# Patient Record
Sex: FEMALE | Employment: UNEMPLOYED | URBAN - METROPOLITAN AREA
[De-identification: names, ages, dates, MRNs, and addresses within clinical notes are randomized per-mention and may not be internally consistent; named-entity substitution may affect disease eponyms.]

---

## 2017-01-24 ENCOUNTER — TELEPHONE (OUTPATIENT)
Dept: OBGYN CLINIC | Age: 37
End: 2017-01-24

## 2017-01-24 NOTE — TELEPHONE ENCOUNTER
40year old patient requesting to have her IUD taken out. Patient states her sex life has decreased, her face has broken out and she is still having continued vaginal bleeding. Patient added to Dr. Kim Belle schedule to have IUD removed.

## 2017-02-06 ENCOUNTER — OFFICE VISIT (OUTPATIENT)
Dept: OBGYN CLINIC | Age: 37
End: 2017-02-06

## 2017-02-06 VITALS
HEIGHT: 62 IN | WEIGHT: 124.4 LBS | RESPIRATION RATE: 16 BRPM | DIASTOLIC BLOOD PRESSURE: 68 MMHG | BODY MASS INDEX: 22.89 KG/M2 | SYSTOLIC BLOOD PRESSURE: 116 MMHG

## 2017-02-06 DIAGNOSIS — Z30.431 IUD CHECK UP: Primary | ICD-10-CM

## 2017-02-06 NOTE — PROGRESS NOTES
IUD follow up      This is a follow-up visit for Elder Mederos is a 41 yo  who had an Mirena IUD placed 3 months ago on 16. Pt reports that since the IUD placement, the patient has had almost daily vaginal spotting and mild cramping. She denies any HMB or severe pain. She describes having a spotting amount of blood-tinged discharge which has occurred off and on since insertion of the Mirena. It is ruining her sex life with her . She also feels like she has had mood changes and weight gain associated with hormones from IUD. Pt would like to discuss IUD removal today. She does NOT wish to conceive and is NOT interested in alternative contraceptive options. Associated signs and symptoms: she denies dyspareunia, expulsion, heavy bleeding, increased pain, fever, and pelvic pain. No past medical history on file. Depression. Past Surgical History   Procedure Laterality Date    Hx  section       x2    Hx bartholin cyst marsupialization       Social History     Occupational History    Not on file. Social History Main Topics    Smoking status: Never Smoker    Smokeless tobacco: Never Used    Alcohol use 1.2 oz/week     2 Glasses of wine per week    Drug use: No    Sexual activity: Yes     Partners: Male     Birth control/ protection: None     Family History   Problem Relation Age of Onset    No Known Problems Mother        No Known Allergies     Prior to Admission medications    Medication Sig Start Date End Date Taking? Authorizing Provider   buPROPion SR [facility] SR) 150 mg SR tablet Take  by mouth two (2) times a day.    Yes Historical Provider        Review of Systems: History obtained from the patient  Constitutional: negative for weight loss, fever, night sweats, +moodiness, weight gain  Breast: negative for breast lumps, nipple discharge, galactorrhea  GI: negative for change in bowel habits, abdominal pain, black or bloody stools  : negative for frequency, dysuria, hematuria, vaginal discharge, +daily spotting, uterine cramping  MSK: negative for back pain, joint pain, muscle pain  Skin: negative for itching, rash, hives  Psych: negative for anxiety, depression, change in mood      Objective:  Visit Vitals    /68 (BP 1 Location: Right arm, BP Patient Position: Sitting)    Resp 16    Ht 5' 2\" (1.575 m)    Wt 124 lb 6.4 oz (56.4 kg)    LMP  (LMP Unknown)    BMI 22.75 kg/m2       PHYSICAL EXAMINATION    Constitutional  · Appearance: well-nourished, well developed, alert, in no acute distress    Gastrointestinal  · Abdominal Examination: abdomen non-tender to palpation, normal bowel sounds, no masses present  · Liver and spleen: no hepatomegaly present, spleen not palpable  · Hernias: no hernias identified    Genitourinary  · External Genitalia: normal appearance for age, no discharge present, no tenderness present, no inflammatory lesions present, no masses present, no atrophy present  · Vagina: normal vaginal vault without central or paravaginal defects, no discharge present, no inflammatory lesions present, no masses present  · Bladder: non-tender to palpation  · Urethra: appears normal  · Cervix: normal with IUD string visible and appropriate length, no cervicitis, no CMT  · Uterus: normal size, shape and consistency  · Adnexa: no adnexal tenderness present, no adnexal masses present  · Perineum: perineum within normal limits, no evidence of trauma, no rashes or skin lesions present    Skin  · General Inspection: no rash, no lesions identified    Neurologic/Psychiatric  · Mental Status:  · Orientation: grossly oriented to person, place and time  · Mood and Affect: mood normal, affect appropriate    Assessment:   41 yo presenting for IUD check and discussion of IUD removal due to almost daily cramping and spotting x 2 months since insertion in Nov 2016.     Plan:   -discussed that some light daily bleeding and cramping can be normal in first couple of months after IUD insertion  -discussed ultrasound to check IUD position, though on exam IUD strings visible, and pt without any significant pain, benign exam  -pt to RTC in 1 month, if still having daily spotting at that time could consider ultrasound   -pain and bleeding precautions reviewed    Eder Styles MD  2/6/2017  10:12 AM

## 2017-02-06 NOTE — PROGRESS NOTES
[facility]  OFFICE PROCEDURE PROGRESS NOTE        Chart reviewed for the following:   Wali ADAM have reviewed the History, Physical and updated the Allergic reactions for [address] performed immediately prior to start of procedure:   Wali ADAM have performed the following reviews on Rhina Shelley prior to the start of the procedure:            * Patient was identified by name and date of birth   * Agreement on procedure being performed was verified  * Risks and Benefits explained to the patient  * Procedure site verified and marked as necessary  * Patient was positioned for comfort  * Consent was signed and verified     Time: 9:45am      Date of procedure: 2017    Procedure performed by:  Laurence Marie MD    Provider assisted by: Wali Salinas RN    Patient assisted by: self    How tolerated by patient: tolerated the procedure well with no complications    Post Procedural Pain Scale: 0 - No Hurt    Comments: none            -----------------------------------IUD REMOVAL---------------------  Indications for Removal:  Rhina Shelley is a ,  40 y.o. female [facility] whose No LMP recorded (lmp unknown). Patient is not currently having periods (Reason: IUD). was on . who presents today for IUD removal. Her current IUD was placedtwo months. She has not had *** problems with the IUD. She requests removal of the IUD because she doesn't like it. The IUD removal procedure was discussed with the patient and she had no further questions. Procedure: The patient was placed in a dorsal lithotomy position and appropriately draped. On bimanual exam the uterus was anterior and normal in size with no tenderness present. A speculum exam was performed and the cervix was visualized. The cervix was prepped with zephiran solution. The IUD string was visualized. Using ring forceps , the string was grasped and the IUD removed intact.  The IUD was shown to the patient.

## 2018-01-12 ENCOUNTER — OFFICE VISIT (OUTPATIENT)
Dept: OBGYN CLINIC | Age: 38
End: 2018-01-12

## 2018-01-12 VITALS
BODY MASS INDEX: 21.16 KG/M2 | WEIGHT: 115 LBS | SYSTOLIC BLOOD PRESSURE: 110 MMHG | HEIGHT: 62 IN | DIASTOLIC BLOOD PRESSURE: 68 MMHG | RESPIRATION RATE: 16 BRPM

## 2018-01-12 DIAGNOSIS — N93.9 ABNORMAL UTERINE BLEEDING (AUB): ICD-10-CM

## 2018-01-12 DIAGNOSIS — Z30.9 ENCOUNTER FOR CONTRACEPTIVE MANAGEMENT, UNSPECIFIED TYPE: ICD-10-CM

## 2018-01-12 DIAGNOSIS — N89.8 VAGINAL DISCHARGE: Primary | ICD-10-CM

## 2018-01-12 LAB
HCG URINE, QL. (POC): NEGATIVE
VALID INTERNAL CONTROL?: YES

## 2018-01-12 RX ORDER — MEDROXYPROGESTERONE ACETATE 150 MG/ML
150 INJECTION, SUSPENSION INTRAMUSCULAR ONCE
Qty: 1 SYRINGE | Refills: 4 | Status: SHIPPED | OUTPATIENT
Start: 2018-01-12 | End: 2018-01-12

## 2018-01-12 NOTE — PROGRESS NOTES
Date last pap: n/a. Last Depo-Provera: new start. Side Effects if any: none. Serum HCG indicated? UPT today was negative. Depo-Provera 150 mg IM given by: Wally Joyce RN. Next appointment due 03/28/18-04/11/18. Patient received depo injection in right upper gluteus without complications per MD order. Patient given future dates and advised to make next appt at . Patient verbalized understanding.

## 2018-01-12 NOTE — PATIENT INSTRUCTIONS
Pelvic Exam: Care Instructions  Your Care Instructions    When your doctor examines all of your pelvic organs, it's called a pelvic exam. Two good reasons to have this kind of exam are to check for sexually transmitted infections (STIs) and to get a Pap test. A Pap test is also called a Pap smear. It checks for early changes that can lead to cancer of the cervix. Sometimes a pelvic exam is part of a regular checkup. In this case, you can do some things to make your test results as accurate as possible. · Try to schedule the exam when you don't have your period. · Don't use douches, tampons, or vaginal medicines, sprays, or powders for 24 hours before your exam.  · Don't have sex for 24 hours before your exam.  Other times, women have this kind of exam at any time of the month. This is because they have pelvic pain, bleeding, or discharge. Or they may have another pelvic problem. Before your exam, it's important to share some information with your doctor. For example, if you are a survivor of rape or sexual abuse, you can talk about any concerns you may have. Your doctor will also want to know if you are pregnant or use birth control. And he or she will want to hear about any problems, surgeries, or procedures you have had in your pelvic area. You will also need to tell your doctor when your last period was. Follow-up care is a key part of your treatment and safety. Be sure to make and go to all appointments, and call your doctor if you are having problems. It's also a good idea to know your test results and keep a list of the medicines you take. How is a pelvic exam done? · During a pelvic exam, you will:  ¨ Take off your clothes below the waist. You will get a paper or cloth cover to put over the lower half of your body. Patsi Feil on your back on an exam table. Your feet will be raised above you. Stirrups will support your feet. · The doctor will:  [place] you to relax your knees.  Your knees need to lean out, toward the walls. ¨ Check the opening of your vagina for sores or swelling. ¨ Gently put a tool called a speculum into your vagina. It opens the vagina a little bit. You will feel some pressure. But if you are relaxed, it will not hurt. It lets your doctor see inside the vagina. ¨ Use a small brush, spatula, or swab to get a sample of cells, if you are having a Pap test or culture. The doctor then removes the speculum. ¨ Put on gloves and put one or two fingers of one hand into your vagina. The other hand goes on your lower belly. This lets your doctor feel your pelvic organs. You will probably feel some pressure. Try to stay relaxed. ¨ Put one gloved finger into your rectum and one into your vagina, if needed. This can also help check your pelvic organs. This exam takes about 10 minutes. At the end, you will get a washcloth or tissue to clean your vaginal area. It's normal to have some discharge after this exam. You can then get dressed. Some test results may be ready right away. But results from a culture or a Pap test may take several days or a few weeks. Why should you have a pelvic exam?  · You want to have recommended screening tests. This includes a Pap test.  · You think you have a vaginal infection. Signs include itching, burning, or unusual discharge. · You might have been exposed to a sexually transmitted infection (STI), such as chlamydia or herpes. · You have vaginal bleeding that is not part of your normal menstrual period. · You have pain in your belly or pelvis. · You have been sexually assaulted. A pelvic exam lets your doctor collect evidence and check for STIs. · You are pregnant. · You are having trouble getting pregnant. What are the risks of a pelvic exam?  There are no risks from a pelvic exam.  When should you call for help? Watch closely for changes in your health, and be sure to contact your doctor if you have any problems. Where can you learn more?   Go to http://ellie-toni.info/. Enter G667 in the search box to learn more about \"Pelvic Exam: Care Instructions. \"  Current as of: October 13, 2016  Content Version: 11.4  © 2775-7454 [employer]. Care instructions adapted under license by [employer] (which disclaims liability or warranty for this information). If you have questions about a medical condition or this instruction, always ask your healthcare professional. [place] [address] any warranty or liability for your use of this information.

## 2018-01-12 NOTE — MR AVS SNAPSHOT
Visit Information Date & Time Provider Department Dept. Phone Encounter #  
 1/12/2018 10:20 AM Musa Dillon MD [facility] [phone] [id number] Upcoming Health Maintenance Date Due Influenza Age 5 to Adult 8/1/2017 PAP AKA CERVICAL CYTOLOGY 10/12/2019 Allergies as of 1/12/2018  Review Complete On: 1/12/2018 By: Monica Ram RN No Known Allergies Current Immunizations  Never Reviewed No immunizations on file. Not reviewed this visit You Were Diagnosed With   
  
 Codes Comments Vaginal discharge    -  Primary ICD-10-CM: N89.8 ICD-9-CM: 623.5 Vaginal odor     ICD-10-CM: N89.8 ICD-9-CM: 625.8 Vitals BP Resp Height(growth percentile) Weight(growth percentile) BMI OB Status 110/68 (BP 1 Location: Right arm, BP Patient Position: Sitting) 16 5' 2\" (1.575 m) 115 lb (52.2 kg) 21.03 kg/m2 IUD Smoking Status Never Smoker BMI and BSA Data Body Mass Index Body Surface Area 21.03 kg/m 2 1.51 m 2 Preferred Pharmacy Pharmacy Name Phone [id number] [address] [place] [address]) [phone] Your Updated Medication List  
  
   
This list is accurate as of: 1/12/18 10:38 AM.  Always use your most recent med list.  
  
  
  
  
 buPROPion  mg SR tablet Commonly known as:  Tomma Peers Take  by mouth two (2) times a day. Patient Instructions Pelvic Exam: Care Instructions Your Care Instructions When your doctor examines all of your pelvic organs, it's called a pelvic exam. Two good reasons to have this kind of exam are to check for sexually transmitted infections (STIs) and to get a Pap test. A Pap test is also called a Pap smear. It checks for early changes that can lead to cancer of the cervix. Sometimes a pelvic exam is part of a regular checkup.  In this case, you can do some things to make your test results as accurate as possible. · Try to schedule the exam when you don't have your period. · Don't use douches, tampons, or vaginal medicines, sprays, or powders for 24 hours before your exam. 
· Don't have sex for 24 hours before your exam. 
Other times, women have this kind of exam at any time of the month. This is because they have pelvic pain, bleeding, or discharge. Or they may have another pelvic problem. Before your exam, it's important to share some information with your doctor. For example, if you are a survivor of rape or sexual abuse, you can talk about any concerns you may have. Your doctor will also want to know if you are pregnant or use birth control. And he or she will want to hear about any problems, surgeries, or procedures you have had in your pelvic area. You will also need to tell your doctor when your last period was. Follow-up care is a key part of your treatment and safety. Be sure to make and go to all appointments, and call your doctor if you are having problems. It's also a good idea to know your test results and keep a list of the medicines you take. How is a pelvic exam done? · During a pelvic exam, you will: ¨ Take off your clothes below the waist. You will get a paper or cloth cover to put over the lower half of your body. [place] on your back on an exam table. Your feet will be raised above you. Stirrups will support your feet. · The doctor will: ¨ Ask you to relax your knees. Your knees need to lean out, toward the walls. ¨ Check the opening of your vagina for sores or swelling. ¨ Gently put a tool called a speculum into your vagina. It opens the vagina a little bit. You will feel some pressure. But if you are relaxed, it will not hurt. It lets your doctor see inside the vagina. ¨ Use a small brush, spatula, or swab to get a sample of cells, if you are having a Pap test or culture. The doctor then removes the speculum. ¨ Put on gloves and put one or two fingers of one hand into your vagina. The other hand goes on your lower belly. This lets your doctor feel your pelvic organs. You will probably feel some pressure. Try to stay relaxed. ¨ Put one gloved finger into your rectum and one into your vagina, if needed. This can also help check your pelvic organs. This exam takes about 10 minutes. At the end, you will get a washcloth or tissue to clean your vaginal area. It's normal to have some discharge after this exam. You can then get dressed. Some test results may be ready right away. But results from a culture or a Pap test may take several days or a few weeks. Why should you have a pelvic exam? 
· You want to have recommended screening tests. This includes a Pap test. 
· You think you have a vaginal infection. Signs include itching, burning, or unusual discharge. · You might have been exposed to a sexually transmitted infection (STI), such as chlamydia or herpes. · You have vaginal bleeding that is not part of your normal menstrual period. · You have pain in your belly or pelvis. · You have been sexually assaulted. A pelvic exam lets your doctor collect evidence and check for STIs. · You are pregnant. · You are having trouble getting pregnant. What are the risks of a pelvic exam? 
There are no risks from a pelvic exam. 
When should you call for help? Watch closely for changes in your health, and be sure to contact your doctor if you have any problems. Where can you learn more? Go to http://ellie-toni.info/. Enter X450 in the search box to learn more about \"Pelvic Exam: Care Instructions. \" Current as of: October 13, 2016 Content Version: 11.4 © 2500-4948 [employer]. Care instructions adapted under license by [employer] (which disclaims liability or warranty for this information).  If you have questions about a medical condition or this instruction, always ask your healthcare professional. [address] any warranty or liability for your use of this information. Introducing hospitals & HEALTH SERVICES! Dear Issac Cannon: Thank you for requesting a ValetAnywhere account. Our records indicate that you already have an active ValetAnywhere account. You can access your account anytime at [email or web address]://[employer]. InMyShow/[employer] Did you know that you can access your hospital and ER discharge instructions at any time in ValetAnywhere? You can also review all of your test results from your hospital stay or ER visit. Additional Information If you have questions, please visit the Frequently Asked Questions section of the ValetAnywhere website at [email or web address]. InMyShow/[employer]/. Remember, ValetAnywhere is NOT to be used for urgent needs. For medical emergencies, dial 911. Now available from your iPhone and Android! Please provide this summary of care documentation to your next provider. If you have any questions after today's visit, please call [phone].

## 2018-01-12 NOTE — PROGRESS NOTES
Abnormal Uterine bleeding evaluation    Chief Complaint   Vaginal Discharge      HPI  40 y.o. female complains of bloody vaginal discharge and irregular bleeding since insertion of her IUD. Patient feels as if she has a mini cycle every week since having the Mirena placed. She originally had the IUD placed to help with heavy bleeding but continues to have issues. She denies additional symptoms at this time. The patient denies aggravating factors. She is not concerned about possible STI exposure at this time. She denies exposure to new chemicals ot hygenic agents  Previous treatment included: none    History reviewed. No pertinent past medical history. Past Surgical History:   Procedure Laterality Date    HX BARTHOLIN CYST MARSUPIALIZATION      HX  SECTION      x2     Social History     Occupational History    Not on file. Social History Main Topics    Smoking status: Never Smoker    Smokeless tobacco: Never Used    Alcohol use 1.2 oz/week     2 Glasses of wine per week    Drug use: No    Sexual activity: Yes     Partners: Male     Birth control/ protection: None     Family History   Problem Relation Age of Onset    No Known Problems Mother         No Known Allergies  Prior to Admission medications    Medication Sig Start Date End Date Taking? Authorizing Provider   buPROPion SR [facility] SR) 150 mg SR tablet Take  by mouth two (2) times a day.    Yes Historical Provider                      Review of Systems - History obtained from the patient  Constitutional: negative for weight loss, fever, night sweats  Breast: negative for breast lumps, nipple discharge, galactorrhea  GI: negative for change in bowel habits, abdominal pain, black or bloody stools  : negative for frequency, dysuria, hematuria  MSK: negative for back pain, joint pain, muscle pain  Skin: negative for itching, rash, hives  Neuro: negative for dizziness, headache, confusion, weakness  Psych: negative for anxiety, depression, change in mood  Heme/lymph: negative for bleeding, bruising, pallor       Objective:    Visit Vitals    /68 (BP 1 Location: Right arm, BP Patient Position: Sitting)    Resp 16    Ht 5' 2\" (1.575 m)    Wt 115 lb (52.2 kg)    BMI 21.03 kg/m2       Physical Exam:   PHYSICAL EXAMINATION    Constitutional  · Appearance: well-nourished, well developed, alert, in no acute distress    HENT  · Head and Face: appears normal    Genitourinary  · External Genitalia: normal appearance for age, No discharge present, no tenderness present, no inflammatory lesions present, no masses present, no atrophy present  · Vagina:  + discharge present, otherwise normal vaginal vault without central or paravaginal defects, no inflammatory lesions present, no masses present  · Bladder: non-tender to palpation  · Urethra: appears normal  · Cervix: normal   · Uterus: normal size, shape and consistency  · Adnexa: no adnexal tenderness present, no adnexal masses present  · Perineum: perineum within normal limits, no evidence of trauma, no rashes or skin lesions present  · Anus: anus within normal limits, no hemorrhoids present  · Inguinal Lymph Nodes: no lymphadenopathy present    Skin  · General Inspection: no rash, no lesions identified    Neurologic/Psychiatric  · Mental Status:  · Orientation: grossly oriented to person, place and time  · Mood and Affect: mood normal, affect appropriate      Assessment/Plan:   Persistent aub with iud. Removed today will start Depo Provera instead. Discussed other hormonal options as well as ablation if she continues to have heavy cycles. If aub does not improve with the depo then she will rto for ultrasound and possible end bx. ROV prn if symptoms persist or worsen.     [facility]  OFFICE PROCEDURE PROGRESS NOTE        Chart reviewed for the following:   Steve Menjivar MD, have reviewed the History, Physical and updated the Allergic reactions for Sherren Cushing [facility] performed immediately prior to start of procedure:   I, Aparna Contreras MD, have performed the following reviews on Adriana Ruelas prior to the start of the procedure:            * Patient was identified by name and date of birth   * Agreement on procedure being performed was verified  * Risks and Benefits explained to the patient  * Procedure site verified and marked as necessary  * Patient was positioned for comfort  * Consent was signed and verified     Date of procedure: 1/15/2018    Procedure performed by:  Aparna Contreras MD    Patient assisted by: self    How tolerated by patient: tolerated the procedure well with no complications    Post Procedural Pain Scale: 0 - No Hurt    Comments: none          IUD REMOVAL  Indications for Removal:  Adriana Ruelas is a ,  40 y.o. female [facility] [place] who has had continued bleeding since insertion of the IUD. She presents today for IUD removal.   The IUD removal procedure was discussed with the patient and she had no further questions. Procedure: The patient was placed in a dorsal lithotomy position and appropriately draped. On bimanual exam the uterus was anterior and normal in size with no tenderness present. A speculum exam was performed and the cervix was visualized. The cervix was prepped with zephiran solution. The IUD string was visualized. Using ring forceps , the string was grasped and the IUD removed intact. The IUD was shown to the patient.

## 2018-03-28 ENCOUNTER — OFFICE VISIT (OUTPATIENT)
Dept: OBGYN CLINIC | Age: 38
End: 2018-03-28

## 2018-03-28 VITALS
BODY MASS INDEX: 21.35 KG/M2 | HEIGHT: 62 IN | SYSTOLIC BLOOD PRESSURE: 106 MMHG | DIASTOLIC BLOOD PRESSURE: 64 MMHG | WEIGHT: 116 LBS

## 2018-03-28 DIAGNOSIS — Z01.419 ENCOUNTER FOR GYNECOLOGICAL EXAMINATION WITHOUT ABNORMAL FINDING: Primary | ICD-10-CM

## 2018-03-28 DIAGNOSIS — R39.15 URGENCY OF URINATION: ICD-10-CM

## 2018-03-28 RX ORDER — NYSTATIN AND TRIAMCINOLONE ACETONIDE 100000; 1 [USP'U]/G; MG/G
CREAM TOPICAL
Qty: 30 G | Refills: 0 | Status: SHIPPED | OUTPATIENT
Start: 2018-03-28 | End: 2019-02-11

## 2018-03-28 RX ORDER — MEDROXYPROGESTERONE ACETATE 150 MG/ML
150 INJECTION, SUSPENSION INTRAMUSCULAR ONCE
COMMUNITY
End: 2018-03-28 | Stop reason: SINTOL

## 2018-03-28 RX ORDER — ETONOGESTREL AND ETHINYL ESTRADIOL 11.7; 2.7 MG/1; MG/1
INSERT, EXTENDED RELEASE VAGINAL
Qty: 3 DEVICE | Refills: 4 | Status: SHIPPED | OUTPATIENT
Start: 2018-03-28 | End: 2019-02-11

## 2018-03-28 NOTE — PATIENT INSTRUCTIONS
Pelvic Exam: Care Instructions  Your Care Instructions    When your doctor examines all of your pelvic organs, it's called a pelvic exam. Two good reasons to have this kind of exam are to check for sexually transmitted infections (STIs) and to get a Pap test. A Pap test is also called a Pap smear. It checks for early changes that can lead to cancer of the cervix. Sometimes a pelvic exam is part of a regular checkup. In this case, you can do some things to make your test results as accurate as possible. · Try to schedule the exam when you don't have your period. · Don't use douches, tampons, or vaginal medicines, sprays, or powders for 24 hours before your exam.  · Don't have sex for 24 hours before your exam.  Other times, women have this kind of exam at any time of the month. This is because they have pelvic pain, bleeding, or discharge. Or they may have another pelvic problem. Before your exam, it's important to share some information with your doctor. For example, if you are a survivor of rape or sexual abuse, you can talk about any concerns you may have. Your doctor will also want to know if you are pregnant or use birth control. And he or she will want to hear about any problems, surgeries, or procedures you have had in your pelvic area. You will also need to tell your doctor when your last period was. Follow-up care is a key part of your treatment and safety. Be sure to make and go to all appointments, and call your doctor if you are having problems. It's also a good idea to know your test results and keep a list of the medicines you take. How is a pelvic exam done? · During a pelvic exam, you will:  ¨ Take off your clothes below the waist. You will get a paper or cloth cover to put over the lower half of your body. Merrianne Schaumann on your back on an exam table. Your feet will be raised above you. Stirrups will support your feet. · The doctor will:  Mary Bush you to relax your knees.  Your knees need to lean out, toward the walls. ¨ Check the opening of your vagina for sores or swelling. ¨ Gently put a tool called a speculum into your vagina. It opens the vagina a little bit. You will feel some pressure. But if you are relaxed, it will not hurt. It lets your doctor see inside the vagina. ¨ Use a small brush, spatula, or swab to get a sample of cells, if you are having a Pap test or culture. The doctor then removes the speculum. ¨ Put on gloves and put one or two fingers of one hand into your vagina. The other hand goes on your lower belly. This lets your doctor feel your pelvic organs. You will probably feel some pressure. Try to stay relaxed. ¨ Put one gloved finger into your rectum and one into your vagina, if needed. This can also help check your pelvic organs. This exam takes about 10 minutes. At the end, you will get a washcloth or tissue to clean your vaginal area. It's normal to have some discharge after this exam. You can then get dressed. Some test results may be ready right away. But results from a culture or a Pap test may take several days or a few weeks. Why should you have a pelvic exam?  · You want to have recommended screening tests. This includes a Pap test.  · You think you have a vaginal infection. Signs include itching, burning, or unusual discharge. · You might have been exposed to a sexually transmitted infection (STI), such as chlamydia or herpes. · You have vaginal bleeding that is not part of your normal menstrual period. · You have pain in your belly or pelvis. · You have been sexually assaulted. A pelvic exam lets your doctor collect evidence and check for STIs. · You are pregnant. · You are having trouble getting pregnant. What are the risks of a pelvic exam?  There are no risks from a pelvic exam.  When should you call for help? Watch closely for changes in your health, and be sure to contact your doctor if you have any problems. Where can you learn more?   Go to http://ellie-toni.info/. Enter Y876 in the search box to learn more about \"Pelvic Exam: Care Instructions. \"  Current as of: October 13, 2016  Content Version: 11.4  © 1816-1666 [employer], [employer]. Care instructions adapted under license by [employer] (which disclaims liability or warranty for this information). If you have questions about a medical condition or this instruction, always ask your healthcare professional. [place] [address] any warranty or liability for your use of this information.

## 2018-03-28 NOTE — MR AVS SNAPSHOT
[address] [facility] [address] [facility] 
[phone] Patient: Clarence Avilez MRN: [id number] Visit Information Date & Time Provider Department Dept. Phone Encounter #  
 3/28/2018 10:20 AM John Byrd MD Harlan Pinedo [phone] [id number] Upcoming Health Maintenance Date Due Influenza Age 5 to Adult 8/1/2017 PAP AKA CERVICAL CYTOLOGY 10/12/2019 Allergies as of 3/28/2018  Review Complete On: 3/28/2018 By: César Castellano No Known Allergies Current Immunizations  Never Reviewed No immunizations on file. Not reviewed this visit Vitals BP Height(growth percentile) Weight(growth percentile) BMI OB Status Smoking Status 106/64 5' 2\" (1.575 m) 116 lb (52.6 kg) 21.22 kg/m2 Injection Never Smoker BMI and BSA Data Body Mass Index Body Surface Area  
 21.22 kg/m 2 1.52 m 2 Preferred Pharmacy Pharmacy Name Phone [id number] [place], [address] [place] [address]) [phone] Your Updated Medication List  
  
   
This list is accurate as of 3/28/18 10:23 AM.  Always use your most recent med list.  
  
  
  
  
 buPROPion  mg SR tablet Commonly known as:  Lashonda Scot Take  by mouth two (2) times a day. DEPO-PROVERA 150 mg/mL injection Generic drug:  medroxyPROGESTERone 150 mg by IntraMUSCular route once. Patient Instructions Pelvic Exam: Care Instructions Your Care Instructions When your doctor examines all of your pelvic organs, it's called a pelvic exam. Two good reasons to have this kind of exam are to check for sexually transmitted infections (STIs) and to get a Pap test. A Pap test is also called a Pap smear. It checks for early changes that can lead to cancer of the cervix. Sometimes a pelvic exam is part of a regular checkup. In this case, you can do some things to make your test results as accurate as possible. · Try to schedule the exam when you don't have your period. · Don't use douches, tampons, or vaginal medicines, sprays, or powders for 24 hours before your exam. 
· Don't have sex for 24 hours before your exam. 
Other times, women have this kind of exam at any time of the month. This is because they have pelvic pain, bleeding, or discharge. Or they may have another pelvic problem. Before your exam, it's important to share some information with your doctor. For example, if you are a survivor of rape or sexual abuse, you can talk about any concerns you may have. Your doctor will also want to know if you are pregnant or use birth control. And he or she will want to hear about any problems, surgeries, or procedures you have had in your pelvic area. You will also need to tell your doctor when your last period was. Follow-up care is a key part of your treatment and safety. Be sure to make and go to all appointments, and call your doctor if you are having problems. It's also a good idea to know your test results and keep a list of the medicines you take. How is a pelvic exam done? · During a pelvic exam, you will: ¨ Take off your clothes below the waist. You will get a paper or cloth cover to put over the lower half of your body. Peter Risk on your back on an exam table. Your feet will be raised above you. Stirrups will support your feet. · The doctor will: ¨ Ask you to relax your knees. Your knees need to lean out, toward the walls. ¨ Check the opening of your vagina for sores or swelling. ¨ Gently put a tool called a speculum into your vagina. It opens the vagina a little bit. You will feel some pressure. But if you are relaxed, it will not hurt. It lets your doctor see inside the vagina.  
¨ Use a small brush, spatula, or swab to get a sample of cells, if you are having a Pap test or culture. The doctor then removes the speculum. ¨ Put on gloves and put one or two fingers of one hand into your vagina. The other hand goes on your lower belly. This lets your doctor feel your pelvic organs. You will probably feel some pressure. Try to stay relaxed. ¨ Put one gloved finger into your rectum and one into your vagina, if needed. This can also help check your pelvic organs. This exam takes about 10 minutes. At the end, you will get a washcloth or tissue to clean your vaginal area. It's normal to have some discharge after this exam. You can then get dressed. Some test results may be ready right away. But results from a culture or a Pap test may take several days or a few weeks. Why should you have a pelvic exam? 
· You want to have recommended screening tests. This includes a Pap test. 
· You think you have a vaginal infection. Signs include itching, burning, or unusual discharge. · You might have been exposed to a sexually transmitted infection (STI), such as chlamydia or herpes. · You have vaginal bleeding that is not part of your normal menstrual period. · You have pain in your belly or pelvis. · You have been sexually assaulted. A pelvic exam lets your doctor collect evidence and check for STIs. · You are pregnant. · You are having trouble getting pregnant. What are the risks of a pelvic exam? 
There are no risks from a pelvic exam. 
When should you call for help? Watch closely for changes in your health, and be sure to contact your doctor if you have any problems. Where can you learn more? Go to http://ellie-toni.info/. Enter Q498 in the search box to learn more about \"Pelvic Exam: Care Instructions. \" Current as of: October 13, 2016 Content Version: 11.4 © 9911-1673 "[employer]".  Care instructions adapted under license by [employer] (which disclaims liability or warranty for this information). If you have questions about a medical condition or this instruction, always ask your healthcare professional. [address] any warranty or liability for your use of this information. Introducing [facility] & [facility] SERVICES! Dear Aiyana Caruso: Thank you for requesting a [employer] account. Our records indicate that you already have an active [employer] account. You can access your account anytime at [email or web address] Did you know that you can access your hospital and ER discharge instructions at any time in [employer]? You can also review all of your test results from your hospital stay or ER visit. Additional Information If you have questions, please visit the Frequently Asked Questions section of the [employer] website at [email or web address] Remember, [employer] is NOT to be used for urgent needs. For medical emergencies, dial 911. Now available from your iPhone and Android! Please provide this summary of care documentation to your next provider. If you have any questions after today's visit, please call [phone].

## 2018-03-28 NOTE — PROGRESS NOTES
Annual exam ages 21-44    Bethany Stacy is a ,  41 y.o. female [facility] No LMP recorded. Patient has had an injection. .    She presents for her annual checkup. She is having significant issues with the depo injection. She c/o breast enlargement and wishes to discontinue the injection. She also c/o urinary urgency. With regard to the Gardasil vaccine, she is older than the FDA approved age to receive it. Menstrual status:    Her periods are absent due to depo provera. She denies dysmenorrhea. She reports no premenstrual symptoms. Contraception:    The current method of family planning is Depo-Provera injections. Sexual history:     She  reports that she currently engages in sexual activity and has had male partners. She reports using the following method of birth control/protection: Injection. .    Medical conditions:    Since her last annual GYN exam about one year ago, she has not the following changes in her health history: none. Pap and Mammogram History:    Her most recent Pap smear was normal, obtained 1.5 year(s) ago. The patient has never had a mammogram.    Breast Cancer History/Substance Abuse: negative    Past Medical History:   Diagnosis Date    IUD (intrauterine device) in place 16 Mirena removed 2018    Pap smear for cervical cancer screening 10/12/16 neg HPV NEG     Past Surgical History:   Procedure Laterality Date    HX BARTHOLIN CYST MARSUPIALIZATION      HX  SECTION      x2       Current Outpatient Prescriptions   Medication Sig Dispense Refill    medroxyPROGESTERone (DEPO-PROVERA) 150 mg/mL injection 150 mg by IntraMUSCular route once.  buPROPion SR (WELLBUTRIN SR) 150 mg SR tablet Take  by mouth two (2) times a day. Allergies: Review of patient's allergies indicates no known allergies. Tobacco History:  reports that she has never smoked.  She has never used smokeless tobacco.  Alcohol Abuse:  reports that she drinks about 1.2 oz of alcohol per week   Drug Abuse:  reports that she does not use illicit drugs.     Family Medical/Cancer History:   Family History   Problem Relation Age of Onset    No Known Problems Mother         Review of Systems - History obtained from the patient  Constitutional: negative for weight loss, fever, night sweats  HEENT: negative for hearing loss, earache, congestion, snoring, sorethroat  CV: negative for chest pain, palpitations, edema  Resp: negative for cough, shortness of breath, wheezing  GI: negative for change in bowel habits, abdominal pain, black or bloody stools  : negative dysuria, hematuria, vaginal discharge  MSK: negative for back pain, joint pain, muscle pain  Breast: negative for breast lumps, nipple discharge, galactorrhea  Skin :negative for itching, rash, hives  Neuro: negative for dizziness, headache, confusion, weakness  Psych: negative for anxiety, depression, change in mood  Heme/lymph: negative for bleeding, bruising, pallor    Physical Exam    Visit Vitals    /64    Ht 5' 2\" (1.575 m)    Wt 116 lb (52.6 kg)    BMI 21.22 kg/m2       Constitutional  · Appearance: well-nourished, well developed, alert, in no acute distress    HENT  · Head and Face: appears normal    Neck  · Inspection/Palpation: normal appearance, no masses or tenderness  · Lymph Nodes: no lymphadenopathy present  · Thyroid: gland size normal, nontender, no nodules or masses present on palpation    Chest  · Respiratory Effort: breathing unlabored  · Auscultation: normal breath sounds    Cardiovascular  · Heart:  · Auscultation: regular rate and rhythm without murmur    Breasts  · Inspection of Breasts: breasts symmetrical, no skin changes, no discharge present, nipple appearance normal, no skin retraction present  · Palpation of Breasts and Axillae: no masses present on palpation, no breast tenderness  · Axillary Lymph Nodes: no lymphadenopathy present    Gastrointestinal  · Abdominal Examination: abdomen non-tender to palpation, normal bowel sounds, no masses present  · Liver and spleen: no hepatomegaly present, spleen not palpable  · Hernias: no hernias identified    Genitourinary  · External Genitalia: normal appearance for age, no discharge present, no tenderness present, no inflammatory lesions present, no masses present, no atrophy present  · Vagina: normal vaginal vault without central or paravaginal defects, no discharge present, no inflammatory lesions present, no masses present  · Bladder: non-tender to palpation  · Urethra: appears normal  · Cervix: normal   · Uterus: normal size, shape and consistency  · Adnexa: no adnexal tenderness present, no adnexal masses present  · Perineum: perineum within normal limits, no evidence of trauma, no rashes or skin lesions present  · Anus: anus within normal limits, no hemorrhoids present  · Inguinal Lymph Nodes: no lymphadenopathy present    Skin  · General Inspection: no rash, no lesions identified    Neurologic/Psychiatric  · Mental Status:  · Orientation: grossly oriented to person, place and time  · Mood and Affect: mood normal, affect appropriate    . Assessment:  Routine gynecologic examination  Her current medical status is satisfactory with no evidence of significant gynecologic issues. Not happy with the side effects of the Depo, will switch to Nuvaring, samples given  Reports urinary frequency- will send urine for culture.     Plan:  Counseled re: diet, exercise, healthy lifestyle  Return for yearly wellness visits  Pt counseled regarding co-testing for high risk HPV with pap  Rec screening mammo at either 36 or 41

## 2018-03-30 LAB — BACTERIA UR CULT: NO GROWTH

## 2019-02-11 ENCOUNTER — OFFICE VISIT (OUTPATIENT)
Dept: OBGYN CLINIC | Age: 39
End: 2019-02-11

## 2019-02-11 DIAGNOSIS — R10.2 PELVIC PAIN IN FEMALE: ICD-10-CM

## 2019-02-11 DIAGNOSIS — R53.83 FATIGUE, UNSPECIFIED TYPE: Primary | ICD-10-CM

## 2019-02-11 NOTE — PROGRESS NOTES
Pelvic Pain evaluation    Soumya Garcia is a 46 y.o. female who complains of pelvic pain. The pain is described as bloating. She also has increased pelvic pain prior to menses. Her symptoms have been unchanged since. Aggravating factors: none. Alleviating factors: none. She also c/o her menses have gotten lighter and shorter the last 4 months. Past Medical History:   Diagnosis Date    IUD (intrauterine device) in place 16 Mirena removed 2018    Pap smear for cervical cancer screening 10/12/16 neg HPV NEG     Past Surgical History:   Procedure Laterality Date    HX BARTHOLIN CYST MARSUPIALIZATION      HX  SECTION      x2     Social History     Occupational History    Not on file   Tobacco Use    Smoking status: Never Smoker    Smokeless tobacco: Never Used   Substance and Sexual Activity    Alcohol use: Yes     Alcohol/week: 1.2 oz     Types: 2 Glasses of wine per week    Drug use: No    Sexual activity: Yes     Partners: Male     Birth control/protection: None     Family History   Problem Relation Age of Onset    No Known Problems Mother        No Known Allergies  Prior to Admission medications    Medication Sig Start Date End Date Taking? Authorizing Provider   buPROPion SR [facility] SR) 150 mg SR tablet Take  by mouth two (2) times a day.    Yes Provider, Historical        Review of Systems: History obtained from the patient  Constitutional: negative for weight loss, fever, night sweats  Breast: negative for breast lumps, nipple discharge, galactorrhea  GI: negative for change in bowel habits, abdominal pain, black or bloody stools  : negative for frequency, dysuria, hematuria, vaginal discharge  MSK: negative for back pain, joint pain, muscle pain  Skin: negative for itching, rash, hives  Psych: negative for anxiety, depression, change in mood      Objective:    Visit Vitals  LMP 2019 (Exact Date)       Physical Exam:     Constitutional  · Appearance: well-nourished, well developed, alert, in no acute distress    Gastrointestinal  · Abdominal Examination: abdomen non-tender to palpation, normal bowel sounds, no masses present  · Liver and spleen: no hepatomegaly present, spleen not palpable  · Hernias: no hernias identified    Genitourinary  · External Genitalia: normal appearance for age, no discharge present, no tenderness present, no inflammatory lesions present, no masses present, no atrophy present  · Vagina: normal vaginal vault without central or paravaginal defects, no discharge present, no inflammatory lesions present, no masses present  · Bladder: non-tender to palpation  · Urethra: appears normal  · Cervix: normal   · Uterus: normal size, shape and consistency  · Adnexa: no adnexal tenderness present, no adnexal masses present  · Perineum: perineum within normal limits, no evidence of trauma, no rashes or skin lesions present  · Anus: anus within normal limits, no hemorrhoids present  · Inguinal Lymph Nodes: no lymphadenopathy present    Skin  · General Inspection: no rash, no lesions identified    Neurologic/Psychiatric  · Mental Status:  · Orientation: grossly oriented to person, place and time  · Mood and Affect: mood normal, affect appropriate    Assessment/Plan:  Abdominal pain/bloating- will rto for ultraosund, if normal consider GI reerral  Fatigue- will check labs  Dysmenorrhea- discussed ocps, pt will treat prophylactically with NSAIDS instead. RTO prn if symptoms persist or worsen. Instructions given to pt. Handouts given to pt.

## 2019-02-13 LAB
25(OH)D3+25(OH)D2 SERPL-MCNC: 35 NG/ML (ref 30–100)
ERYTHROCYTE [DISTWIDTH] IN BLOOD BY AUTOMATED COUNT: 13.1 % (ref 12.3–15.4)
FOLATE BLD-MCNC: 361.9 NG/ML
FOLATE RBC-MCNC: 938 NG/ML
FT4I SERPL CALC-MCNC: 1.3 (ref 1.2–4.9)
HCT VFR BLD AUTO: 38.6 % (ref 34–46.6)
HGB BLD-MCNC: 12.7 G/DL (ref 11.1–15.9)
MCH RBC QN AUTO: 29.2 PG (ref 26.6–33)
MCHC RBC AUTO-ENTMCNC: 32.9 G/DL (ref 31.5–35.7)
MCV RBC AUTO: 89 FL (ref 79–97)
PLATELET # BLD AUTO: 212 X10E3/UL (ref 150–379)
RBC # BLD AUTO: 4.35 X10E6/UL (ref 3.77–5.28)
T3RU NFR SERPL: 22 % (ref 24–39)
T4 SERPL-MCNC: 6.1 UG/DL (ref 4.5–12)
TSH SERPL DL<=0.005 MIU/L-ACNC: 2.77 UIU/ML (ref 0.45–4.5)
VIT B12 SERPL-MCNC: 538 PG/ML (ref 232–1245)
WBC # BLD AUTO: 7.8 X10E3/UL (ref 3.4–10.8)

## 2019-02-28 ENCOUNTER — OFFICE VISIT (OUTPATIENT)
Dept: OBGYN CLINIC | Age: 39
End: 2019-02-28

## 2019-02-28 DIAGNOSIS — N83.201 CYST OF RIGHT OVARY: Primary | ICD-10-CM

## 2019-02-28 DIAGNOSIS — R14.0 BLOATING: ICD-10-CM

## 2019-02-28 NOTE — PROGRESS NOTES
Ultrasound followup    Roxanne Joyce is a 46 y.o. female is here today to review the results of her ultrasound evaluation. Her U/S evaluation is performed because of a previous encounter revealing abdominal pain/ bloating which was identified a few weeks ago. She is here for an initial ultrasound study. The sonogram: UTERUS IS ANTEVERTED, NORMAL IN SIZE AND HETEROGENOUS IN ECHOGENICITY. THE ANTERIOR UTERINE WALL APPEARS BULKY. ENDOMETRIUM MEASURES 6MM IN THICKNESS. NO EVIDENCE OF MASS OR ABNORMALITY SEEN WITHIN  THE ENDOMETRIAL CAVITY. RIGHT OVARY APPEARS TO HAVE A MASS WITH LOW LEVEL ECHOES THAT MEASURES 1.7 X 1.7 X 1.8CM. THIS COULD REPRESENT A RESOLVING HEMORRHAGIC CYST. LEFT OVARY APPEARS WITHIN NORMAL LIMITS. NO FREE FLUID SEEN IN THE CDS. .    See detailed report for more information. Past Medical History:   Diagnosis Date    IUD (intrauterine device) in place 16 Mirena removed 2018    Pap smear for cervical cancer screening 10/12/16 neg HPV NEG     Past Surgical History:   Procedure Laterality Date    HX BARTHOLIN CYST MARSUPIALIZATION      HX  SECTION      x2     Social History     Occupational History    Not on file   Tobacco Use    Smoking status: Never Smoker    Smokeless tobacco: Never Used   Substance and Sexual Activity    Alcohol use: Yes     Alcohol/week: 1.2 oz     Types: 2 Glasses of wine per week    Drug use: No    Sexual activity: Yes     Partners: Male     Birth control/protection: None     Family History   Problem Relation Age of Onset    No Known Problems Mother        No Known Allergies  Prior to Admission medications    Medication Sig Start Date End Date Taking? Authorizing Provider   buPROPion SR [facility] SR) 150 mg SR tablet Take  by mouth two (2) times a day.     Provider, Historical        Review of Systems: History obtained from the patient  Constitutional: negative for weight loss, fever, night sweats  Breast: negative for breast lumps, nipple discharge, galactorrhea  GI: negative for change in bowel habits, abdominal pain, black or bloody stools  : negative for frequency, dysuria, hematuria, vaginal discharge  MSK: negative for back pain, joint pain, muscle pain  Skin: negative for itching, rash, hives  Psych: negative for anxiety, depression, change in mood      Objective:  Visit Vitals  LMP 02/09/2019 (Exact Date)       Physical Exam:   PHYSICAL EXAMINATION    Constitutional  · Appearance: well-nourished, well developed, alert, in no acute distress    Skin  · General Inspection: no rash, no lesions identified    Neurologic/Psychiatric  · Mental Status:  · Orientation: grossly oriented to person, place and time  · Mood and Affect: mood normal, affect appropriate      ASSESSMENT/PLAN:  Bloating- likely GI related. Hemorrhagic Ovarian cyst- likely physiologic, will repeat ultrasound in 6 weeks to confirm resolution. RTO prn if symptoms persist or worsen. Instructions given to pt. Handouts given to pt.

## 2019-04-10 ENCOUNTER — HOSPITAL ENCOUNTER (OUTPATIENT)
Dept: GENERAL RADIOLOGY | Age: 39
Discharge: HOME | End: 2019-04-10
Attending: NURSE PRACTITIONER
Payer: COMMERCIAL

## 2019-04-10 DIAGNOSIS — K59.00 CONSTIPATION: ICD-10-CM

## 2019-04-10 PROCEDURE — 74018 RADEX ABDOMEN 1 VIEW: CPT

## 2019-04-11 ENCOUNTER — OFFICE VISIT (OUTPATIENT)
Dept: OBGYN CLINIC | Age: 39
End: 2019-04-11

## 2019-04-11 DIAGNOSIS — N83.201 RIGHT OVARIAN CYST: Primary | ICD-10-CM

## 2019-04-11 NOTE — PATIENT INSTRUCTIONS
Pelvic Pain: Care Instructions  Your Care Instructions    Pelvic pain, or pain in the lower belly, can have many causes. Often pelvic pain is not serious and gets better in a few days. If your pain continues or gets worse, you may need tests and treatment. Tell your doctor about any new symptoms. These may be signs of a serious problem. Follow-up care is a key part of your treatment and safety. Be sure to make and go to all appointments, and call your doctor if you are having problems. It's also a good idea to know your test results and keep a list of the medicines you take. How can you care for yourself at home? · Rest until you feel better. Lie down, and raise your legs by placing a pillow under your knees. · Drink plenty of fluids. You may find that small, frequent sips are easier on your stomach than if you drink a lot at once. Avoid drinks with carbonation or caffeine, such as soda pop, tea, or coffee. · Try eating several small meals instead of 2 or 3 large ones. Eat mild foods, such as rice, dry toast or crackers, bananas, and applesauce. Avoid fatty and spicy foods, other fruits, and alcohol until 48 hours after your symptoms have gone away. · Take an over-the-counter pain medicine, such as acetaminophen (Tylenol), ibuprofen (Advil, Motrin), or naproxen (Aleve). Read and follow all instructions on the label. · Do not take two or more pain medicines at the same time unless the doctor told you to. Many pain medicines have acetaminophen, which is Tylenol. Too much acetaminophen (Tylenol) can be harmful. · You can put a heating pad, a warm cloth, or moist heat on your belly to relieve pain. When should you call for help?   Call your doctor now or seek immediate medical care if:    · You have a new or higher fever.     · You have unusual vaginal bleeding.     · You have new or worse belly or pelvic pain.     · You have vaginal discharge that has increased in amount or smells bad.    Watch closely for changes in your health, and be sure to contact your doctor if:    · You do not get better as expected. Where can you learn more? Go to http://ellie-toni.info/. Enter [phone] in the search box to learn more about \"Pelvic Pain: Care Instructions. \"  Current as of: May 14, 2018  Content Version: 11.9  © 1929-3908 [employer]. Care instructions adapted under license by [employer] (which disclaims liability or warranty for this information). If you have questions about a medical condition or this instruction, always ask your healthcare professional. [place] [address] any warranty or liability for your use of this information.

## 2019-04-11 NOTE — PROGRESS NOTES
Ultrasound followup    Maida Parish is a 46 y.o. female is here today to review the results of her ultrasound evaluation. Her U/S evaluation is performed because of a previous encounter revealing pelvic pain/ cyst ( 6 week follow up ) which was identified 6 weeks ago. She is here for a follow up ultrasound study. The sonogram: TRANSVAGINAL ULTRASOUND PERFORMED  UTERUS IS ANTEVERTED, NORMAL IN SIZE AND ECHOGENICITY. FLUID IS NOTED IN THE CERVIX. THE ENDOMETRIUM IS THICKENED, HETEROGENOUS AND HAS AN IRREGULAR ANTERIOR BORDER. IT  MEASURES 12-15MM IN THICKNESS. A MASS OR ABNORMALITY CAN NOT BE RULED OUT. RIGHT OVARY APPEARS TO HAVE A HETEROGENOUS, SOLID APPEARING MASS. THIS MAY REPRESENT A  DERMOID CYST. MEASUREMENTS ARE ABOVE UNDER CYST. NO BLOOD FLOW IS SEEN IN THIS MASS. LEFT OVARY APPEARS WITHIN NORMAL LIMITS. A FOLLICULAR CYST IS SEEN. NO FREE FLUID SEEN IN THE CDS. .    See detailed report for more information. Past Medical History:   Diagnosis Date    IUD (intrauterine device) in place 16 Mirena removed 2018    Pap smear for cervical cancer screening 10/12/16 neg HPV NEG     Past Surgical History:   Procedure Laterality Date    HX BARTHOLIN CYST MARSUPIALIZATION      HX  SECTION      x2     Social History     Occupational History    Not on file   Tobacco Use    Smoking status: Never Smoker    Smokeless tobacco: Never Used   Substance and Sexual Activity    Alcohol use: Yes     Alcohol/week: 1.2 oz     Types: 2 Glasses of wine per week    Drug use: No    Sexual activity: Yes     Partners: Male     Birth control/protection: None     Family History   Problem Relation Age of Onset    No Known Problems Mother        No Known Allergies  Prior to Admission medications    Medication Sig Start Date End Date Taking? Authorizing Provider   buPROPion SR [facility] FOR [facility] SR) 150 mg SR tablet Take  by mouth two (2) times a day.     Provider, Historical        Review of Systems: History obtained from the patient  Constitutional: negative for weight loss, fever, night sweats  Breast: negative for breast lumps, nipple discharge, galactorrhea  GI: negative for change in bowel habits, abdominal pain, black or bloody stools  : negative for frequency, dysuria, hematuria, vaginal discharge  MSK: negative for back pain, joint pain, muscle pain  Skin: negative for itching, rash, hives  Psych: negative for anxiety, depression, change in mood      Objective: There were no vitals taken for this visit. Physical Exam:   PHYSICAL EXAMINATION    Constitutional  · Appearance: well-nourished, well developed, alert, in no acute distress    Gastrointestinal  · Abdominal Examination: abdomen non-tender to palpation, normal bowel sounds, no masses present  · Liver and spleen: no hepatomegaly present, spleen not palpable  · Hernias: no hernias identified    Skin  · General Inspection: no rash, no lesions identified    Neurologic/Psychiatric  · Mental Status:  · Orientation: grossly oriented to person, place and time  · Mood and Affect: mood normal, affect appropriate      ASSESSMENT:  Ovarian cyst- likely small dermoid, asymptomatic at this time. Offered removal.  Pt declined. Will repeat ultrasound in 3-4 months to confirm stability. PLAN:  RTO prn if symptoms persist or worsen. Instructions given to pt. Handouts given to pt.

## 2019-08-12 ENCOUNTER — OFFICE VISIT (OUTPATIENT)
Dept: OBGYN CLINIC | Age: 39
End: 2019-08-12

## 2019-08-12 DIAGNOSIS — N83.201 CYST OF RIGHT OVARY: Primary | ICD-10-CM

## 2019-08-12 NOTE — PROGRESS NOTES
Ultrasound followup    Chryl Hashimoto is a 46 y.o. female is here today to review the results of her ultrasound evaluation. Her U/S evaluation is performed because of a previous encounter revealing ovarian cysr which was identified several months ago. She is here for a follow up ultrasound study. The sonogram: within normal limits. See detailed report for more information. Past Medical History:   Diagnosis Date    IUD (intrauterine device) in place 16 Mirena removed 2018    Pap smear for cervical cancer screening 10/12/16 neg HPV NEG     Past Surgical History:   Procedure Laterality Date    HX BARTHOLIN CYST MARSUPIALIZATION      HX  SECTION      x2     Social History     Occupational History    Not on file   Tobacco Use    Smoking status: Never Smoker    Smokeless tobacco: Never Used   Substance and Sexual Activity    Alcohol use: Yes     Alcohol/week: 2.0 standard drinks     Types: 2 Glasses of wine per week    Drug use: No    Sexual activity: Yes     Partners: Male     Birth control/protection: None     Family History   Problem Relation Age of Onset    No Known Problems Mother        No Known Allergies  Prior to Admission medications    Medication Sig Start Date End Date Taking? Authorizing Provider   buPROPion SR [facility] SR) 150 mg SR tablet Take  by mouth two (2) times a day. Provider, Historical        Review of Systems: History obtained from the patient  Constitutional: negative for weight loss, fever, night sweats  Breast: negative for breast lumps, nipple discharge, galactorrhea  GI: negative for change in bowel habits, abdominal pain, black or bloody stools  : negative for frequency, dysuria, hematuria, vaginal discharge  MSK: negative for back pain, joint pain, muscle pain  Skin: negative for itching, rash, hives  Psych: negative for anxiety, depression, change in mood      Objective: There were no vitals taken for this visit.     Physical Exam:   PHYSICAL EXAMINATION    Constitutional  · Appearance: well-nourished, well developed, alert, in no acute distress    Gastrointestinal  · Abdominal Examination: abdomen non-tender to palpation, normal bowel sounds, no masses present  · Liver and spleen: no hepatomegaly present, spleen not palpable  · Hernias: no hernias identified    Skin  · General Inspection: no rash, no lesions identified    Neurologic/Psychiatric  · Mental Status:  · Orientation: grossly oriented to person, place and time  · Mood and Affect: mood normal, affect appropriate    ASSESSMENT:  Right ovarian cyst- asymptomatic, stable, Will repeat ultrasound in 6 months. PLAN:  RTO prn if symptoms persist or worsen. Instructions given to pt. Handouts given to pt.

## 2019-09-25 ENCOUNTER — OFFICE VISIT (OUTPATIENT)
Dept: OBGYN CLINIC | Age: 39
End: 2019-09-25

## 2019-09-25 DIAGNOSIS — R10.2 PELVIC PAIN IN FEMALE: Primary | ICD-10-CM

## 2019-09-25 RX ORDER — NYSTATIN AND TRIAMCINOLONE ACETONIDE 100000; 1 [USP'U]/G; MG/G
OINTMENT TOPICAL 2 TIMES DAILY
Qty: 1 TUBE | Refills: 3 | Status: SHIPPED | OUTPATIENT
Start: 2019-09-25 | End: 2019-10-02

## 2019-09-25 NOTE — PROGRESS NOTES
Pelvic Pain evaluation    Selena Gonzalez is a 46 y.o. female who complains of pelvic pain. The pain is described as aching, cramping and heaviness. Pain is located in the RLQ, suprapubic. The pain started a few days ago. Her symptoms have been unchanged since. The patient denies fever. Ultrasound performed today and shows a stable right hemorrhagic cyst.    Past Medical History:   Diagnosis Date    IUD (intrauterine device) in place 16 Mirena removed 2018    Pap smear for cervical cancer screening 10/12/16 neg HPV NEG     Past Surgical History:   Procedure Laterality Date    HX BARTHOLIN CYST MARSUPIALIZATION      HX  SECTION      x2     Social History     Occupational History    Not on file   Tobacco Use    Smoking status: Never Smoker    Smokeless tobacco: Never Used   Substance and Sexual Activity    Alcohol use: Yes     Alcohol/week: 2.0 standard drinks     Types: 2 Glasses of wine per week    Drug use: No    Sexual activity: Yes     Partners: Male     Birth control/protection: None     Family History   Problem Relation Age of Onset    No Known Problems Mother        No Known Allergies  Prior to Admission medications    Medication Sig Start Date End Date Taking? Authorizing Provider   buPROPion SR [facility] SR) 150 mg SR tablet Take  by mouth two (2) times a day. Provider, Historical        Review of Systems: History obtained from the patient  Constitutional: negative for weight loss, fever, night sweats  Breast: negative for breast lumps, nipple discharge, galactorrhea  GI: negative for change in bowel habits, abdominal pain, black or bloody stools  : negative for frequency, dysuria, hematuria, vaginal discharge  MSK: negative for back pain, joint pain, muscle pain  Skin: negative for itching, rash, hives  Psych: negative for anxiety, depression, change in mood      Objective: There were no vitals taken for this visit.     Physical Exam: Constitutional  · Appearance: well-nourished, well developed, alert, in no acute distress    Gastrointestinal  · Abdominal Examination: abdomen non-tender to palpation, normal bowel sounds, no masses present  · Liver and spleen: no hepatomegaly present, spleen not palpable  · Hernias: no hernias identified    Genitourinary  · External Genitalia: normal appearance for age, no discharge present, no tenderness present, no inflammatory lesions present, no masses present, no atrophy present  · Vagina: normal vaginal vault without central or paravaginal defects, no discharge present, no inflammatory lesions present, no masses present  · Bladder: non-tender to palpation  · Urethra: appears normal  · Cervix: normal   · Uterus: normal size, shape and consistency  · Adnexa: + right adnexal tenderness, no obvious amss  · Perineum: perineum within normal limits, no evidence of trauma, no rashes or skin lesions present  · Anus: anus within normal limits, no hemorrhoids present  · Inguinal Lymph Nodes: no lymphadenopathy present    Skin  · General Inspection: no rash, no lesions identified    Neurologic/Psychiatric  · Mental Status:  · Orientation: grossly oriented to person, place and time  · Mood and Affect: mood normal, affect appropriate    Assessment:  Pleivc Pain and bloating, may be related to hemorrhagic cyst for GI issue. If still present in 1 month should f/u with GI. If worsens then needs to rto for exam.    Plan:   RTO prn if symptoms persist or worsen. Instructions given to pt. Handouts given to pt.

## 2020-02-10 ENCOUNTER — OFFICE VISIT (OUTPATIENT)
Dept: OBGYN CLINIC | Age: 40
End: 2020-02-10

## 2020-02-10 VITALS
WEIGHT: 125 LBS | SYSTOLIC BLOOD PRESSURE: 110 MMHG | DIASTOLIC BLOOD PRESSURE: 70 MMHG | HEIGHT: 62 IN | BODY MASS INDEX: 23 KG/M2

## 2020-02-10 DIAGNOSIS — N83.201 CYST OF RIGHT OVARY: ICD-10-CM

## 2020-02-10 DIAGNOSIS — R10.2 PELVIC PAIN IN FEMALE: Primary | ICD-10-CM

## 2020-02-10 NOTE — PROGRESS NOTES
Ovarian cyst evaluation    Chief Complaint   No chief complaint on file. Patient's last menstrual period was 2020. Moris Orlintenisha HPI    Adnexal Mass History:  45 y.o. female presents for follow-up for with right ovarian cyst first noticed by 1 year  ago. She reports that since her last visit her pain has improved and only occassionally feels it. Current Method of Contraception is: none    Imaging History:  She had  Several ultrasound over the last year showing stability    Additional/Aggravating/Alleviating factors:  She denies severe abdominal pain and peritoneal symptoms  The patient denies aggravating factors    Past History  She has no past medical history that is notable  She  denies she had this condition in the past    Past Medical History:   Diagnosis Date    IUD (intrauterine device) in place 16 Mirena removed 2018    Pap smear for cervical cancer screening 10/12/16 neg HPV NEG     Past Surgical History:   Procedure Laterality Date    HX BARTHOLIN CYST MARSUPIALIZATION      HX  SECTION      x2     Social History     Occupational History    Not on file   Tobacco Use    Smoking status: Never Smoker    Smokeless tobacco: Never Used   Substance and Sexual Activity    Alcohol use: Yes     Alcohol/week: 2.0 standard drinks     Types: 2 Glasses of wine per week    Drug use: No    Sexual activity: Yes     Partners: Male     Birth control/protection: None     Family History   Problem Relation Age of Onset    No Known Problems Mother        No Known Allergies  Prior to Admission medications    Medication Sig Start Date End Date Taking? Authorizing Provider   buPROPion SR [facility] SR) 150 mg SR tablet Take  by mouth two (2) times a day.     Provider, Historical        Review of Systems - History obtained from the patient  Negative for:   GI: nausea, vomiting, diarrhea, blood in stools  : see HPI  Skin: negative for rash, hives  Endocrine: negative for polyuria, polydypsia  Heme/lymph: negative for bleeding, bruising, lymph node enlargement or tenderness    Objective:    Visit Vitals  /70   Ht 5' 2\" (1.575 m)   Wt 125 lb (56.7 kg)   LMP 02/03/2020   BMI 22.86 kg/m²       Physical Exam:   PHYSICAL EXAMINATION    Constitutional  · Appearance: well-nourished, well developed, alert, in no acute distress    HENT  · Head and Face: appears normal    Gastrointestinal  · Abdominal Examination: abdomen non-tender to palpation, normal bowel sounds, no masses present  · Liver and spleen: no hepatomegaly present, spleen not palpable  · Hernias: no hernias identified    Skin  · General Inspection: no rash, no lesions identified    Neurologic/Psychiatric  · Mental Status:  · Orientation: grossly oriented to person, place and time  · Mood and Affect: mood normal, affect appropriate    Assessment:   Pelvic Pian- improved, right ovarian cyst.  Discussed with pt that since pain has improved, cyst was small and stable over the last year additional ultrasound not necessary. Plan:   RTO prn worsening of symptoms.

## 2020-06-08 ENCOUNTER — HOSPITAL ENCOUNTER (OUTPATIENT)
Dept: MRI IMAGING | Age: 40
Discharge: HOME | End: 2020-06-08
Payer: COMMERCIAL

## 2020-06-08 DIAGNOSIS — S86.112D GASTROCNEMIUS TEAR, LEFT, SUBSEQUENT ENCOUNTER: ICD-10-CM

## 2020-06-08 PROCEDURE — 73718 MRI LOWER EXTREMITY W/O DYE: CPT

## 2020-06-12 ENCOUNTER — HOSPITAL ENCOUNTER (OUTPATIENT)
Dept: PHYSICAL THERAPY | Age: 40
Discharge: HOME | End: 2020-06-12
Payer: COMMERCIAL

## 2020-06-12 PROCEDURE — 97161 PT EVAL LOW COMPLEX 20 MIN: CPT | Performed by: PHYSICAL THERAPIST

## 2020-06-12 PROCEDURE — 97110 THERAPEUTIC EXERCISES: CPT | Performed by: PHYSICAL THERAPIST

## 2020-06-12 PROCEDURE — 97016 VASOPNEUMATIC DEVICE THERAPY: CPT | Performed by: PHYSICAL THERAPIST

## 2020-06-12 NOTE — PROGRESS NOTES
PT INITIAL EVALUATION NOTE 2-15    Patient Name: Delroy Galeas  Date:2020  : 1980  [x]  Patient  Verified  Payor: [employer] [place] / Plan: [facility] [place] [address] / Product Type: Commerical /    In time:10:30 am   Out time:11:30 am  Total Treatment Time (min): 60  Visit #: 1     Treatment Area: Left leg pain [M79.605]    SUBJECTIVE  Pain Level (0-10 scale): 0/10 at rest, 3/10 L calf, 8/10 initially   Any medication changes, allergies to medications, adverse drug reactions, diagnosis change, or new procedure performed?: [] No    [x] Yes (see summary sheet for update)  Subjective:     21 was running up the steps and felt a pop on her L calf and then there was swelling but no bruising present. She could not move her L ankle the next day. She went to ortho on call. She was given crutches and boot at the Ortho on Call visit and the saw Dr. Fatmata Holden 2 times and got an MRI at [facility] facility and there is a tear of her plantaris. PLOF: on feet all day long - 9and 5year old kids; was running prior to injury - had gotten up to 3 days per week of about 2 miles; she has had plantar fascia pn for about last year.  The running is on concrete   Mechanism of Injury: running up the stairs from sitting position  Previous Treatment/Compliance: got inserts by podiatrist about 2020  PMHx/Surgical Hx: none stated  Work Hx: at home with 2 kids   Living Situation: 2 flights of 15-16 stairs   Pt Goals: back to walking, running and get out of boot   Barriers: none noted  Motivation: excellent  Substance use: none noted   FABQ Score: see FOTO   Cognition: A & O x 4        OBJECTIVE/EXAMINATION  Posture:  WB on R LE with L boot donned  Other Observations:  gastroc area with noted atrophy present L  Gait and Functional Mobility:  ER hip position on L with boot donned; antalgic gait present secondary to height of boot relative to R side shoe  Palpation: reduced gastroc tone; + tenderness at middle of muscle belly L gastroc      AROM Ankle:         R   L  DF    25   10 soreness  PF    50   20 soreness  Ev    20   20  Inv    40   35      LOWER QUARTER   MUSCLE STRENGTH  KEY       R  L  0 - No Contraction  L1, L2 Psoas  5  4-  1 - Trace   L3 Quads  5  3+  2 - Poor   L4 Tib Ant  5  4-  3 - Fair    L5 EHL  5  4+  4 - Good   S1 FHL  5  4  5 - Normal   S2 Hams  5  4-          MMT: quad tone on L fair with reduced noted girth; min quad lag present with SLR; heel raises NT secondary to precautions and injury  Neurological: Reflexes / Sensations: WNL sensation   Special Tests: Francisca's negative L          Modality rationale: decrease edema, decrease inflammation, decrease pain, increase tissue extensibility and increase muscle contraction/control to improve the patients ability to ambulate without pain   Min Type Additional Details    [] Estim: []Att   []Unatt        []TENS instruct                  []IFC  []Premod   []NMES                     []Other:  []w/US   []w/ice   []w/heat  Position:  Location:    []  Traction: [] Cervical       []Lumbar                       [] Prone          []Supine                       []Intermittent   []Continuous Lbs:  [] before manual  [] after manual  []w/heat    []  Ultrasound: []Continuous   [] Pulsed at:                            []1MHz   []3MHz Location:  W/cm2:    []  Paraffin         Location:  []w/heat    []  Ice     []  Heat  []  Ice massage Position:  Location:    []  Laser  []  Other: Position:  Location:   15 [x]  Vasopneumatic Device Pressure:       [x] lo [] med [] hi   Temperature: 36   [x] Skin assessment post-treatment:  [x]intact []redness- no adverse reaction    []redness  adverse reaction:     15 min Therapeutic Exercise:  [x] See flow sheet :   Rationale: increase ROM, increase strength, improve coordination, improve balance and increase proprioception to improve the patients ability to ambulate without AD            With   [x] TE   [] TA   [] neuro   [] other: Patient Education: [x] Review HEP    [] Progressed/Changed HEP based on:   [] positioning   [] body mechanics   [] transfers   [] heat/ice application    [] other:        Other Objective/Functional Measures:see above    Pain Level (0-10 scale) post treatment: 0/10      ASSESSMENT:      [x]  See Plan of Care      Vlad Carter PT, DPT, [facility] 6/12/2020

## 2020-06-12 NOTE — PROGRESS NOTES
[address] [facility], [facility] [address]  Phone: [phone]  Fax: [phone]    Plan of Care/ Statement of Necessity for Physical Therapy Services 2-15    Patient name: Jered Mejia  : 1980  Provider#: [id number]  Referral source: Alessia Garza MD      Medical/Treatment Diagnosis: Left leg pain [M79.605]     Prior Hospitalization: see medical history     Comorbidities: depression  Prior Level of Function: running, care of 2 children, home care, N ADL and N IADL skills  Medications: Verified on Patient Summary List    Start of Care: 2020      Onset Date: 2020       The Plan of Care and following information is based on the information from the initial evaluation. Assessment/ key information: Kamini3 Teresa Abreu presents to our office with a boot donned on her R LE secondary to a plantaris muscle partial tear sustained when running up the stairs at her home. She demonstrates muscle weakness, hip and back discomfort from donned boot, gait deviation, poor single limb balance, muscle atrophy and an inability to perform N ADL and IADL as well as exercise activities secondary to pain present. She will benefit from skilled PT prior to D/C to address the below and allow a return to premorbid activity levels without restriction present.      Evaluation Complexity History MEDIUM  Complexity : 1-2 comorbidities / personal factors will impact the outcome/ POC ; Examination HIGH Complexity : 4+ Standardized tests and measures addressing body structure, function, activity limitation and / or participation in recreation  ;Presentation LOW Complexity : Stable, uncomplicated  ;Clinical Decision Making Other outcome measures TUG with slow performance and gait deviation wtih boot donned  MEDIUM  Overall Complexity Rating: LOW     Problem List: pain affecting function, decrease ROM, decrease strength, impaired gait/ balance, decrease ADL/ functional abilitiies, decrease activity tolerance, decrease flexibility/ joint mobility and decrease transfer abilities   Treatment Plan may include any combination of the following: Therapeutic exercise, Therapeutic activities, Neuromuscular re-education, Physical agent/modality, Gait/balance training, Manual therapy, Patient education, Self Care training, Functional mobility training, Home safety training and Stair training  Patient / Family readiness to learn indicated by: asking questions, trying to perform skills and interest  Persons(s) to be included in education: patient (P)  Barriers to Learning/Limitations: None  Patient Goal (s): to be able to run again  Patient Self Reported Health Status: excellent  Rehabilitation Potential: excellent    Short Term Goals: To be accomplished in 8 treatments:  1. Pt will demo independence with HEP with no v.c.  2. Pt will demo standing tolerance to 15 min with 2/10 pn and no boot donned in clinic setting only  3. Pt will demo ambulation x 250 ft with min antalgia and no LOB with boot donned. 4. Pt will demo single limb stance on L without boot in clinical setting x 30 seconds with no LOB. Long Term Goals: To be accomplished in 16 treatments:  1. Pt will demo independence with HEP at the time of D/C to allow for continued strength and endurance progression  2. Pt will demo low fall risk on appropriate balance scale at the time of D/C  3. Pt will demo amb x 1,000 ft with no pain, no boot and no LOB at the time of D/C  4. Pt will demo all ADL/IADL/work activities without compensation or assistance needed    Frequency / Duration: Patient to be seen 1-2 times per week for up to 16 treatments.     Patient/ Caregiver education and instruction: self care, activity modification, brace/ splint application and exercises    [x]  Plan of care has been reviewed with PTA    Remigio Arriaga, PT, DPT, [facility] 6/12/2020     ________________________________________________________________________    I certify that the above Therapy Services are being furnished while the patient is under my care. I agree with the treatment plan and certify that this therapy is necessary.     [de-identified] Signature:____________________  Date:____________Time: _________

## 2020-06-16 ENCOUNTER — HOSPITAL ENCOUNTER (OUTPATIENT)
Dept: PHYSICAL THERAPY | Age: 40
Discharge: HOME | End: 2020-06-16
Payer: COMMERCIAL

## 2020-06-16 PROCEDURE — 97140 MANUAL THERAPY 1/> REGIONS: CPT | Performed by: PHYSICAL THERAPIST

## 2020-06-16 PROCEDURE — 97016 VASOPNEUMATIC DEVICE THERAPY: CPT | Performed by: PHYSICAL THERAPIST

## 2020-06-16 PROCEDURE — 97110 THERAPEUTIC EXERCISES: CPT | Performed by: PHYSICAL THERAPIST

## 2020-06-16 NOTE — PROGRESS NOTES
PT DAILY TREATMENT NOTE 2-15    Patient Name: Rohan Amanda  Date:2020  : 1980  [x]  Patient  Verified  Payor: [employer] [place] / Plan: [facility] MEDICAL [address] / Product Type: Commerical /    In time:11:00 am  Out time:12:00 pm  Total Treatment Time (min): 60  Visit #:  2    Treatment Area: Left leg pain [M79.605]    SUBJECTIVE  Pain Level (0-10 scale): 0/10; 5/10  Any medication changes, allergies to medications, adverse drug reactions, diagnosis change, or new procedure performed?: [x] No    [] Yes (see summary sheet for update)  Subjective functional status/changes:   [x] No changes reported  Pt reports that she has aching at the end of the day. She reports that she has done her exercises and is icing every day. The R foot shoe attachment is helping with back and hip soreness.      OBJECTIVE    Modality rationale: decrease inflammation, decrease pain, increase tissue extensibility and increase muscle contraction/control to improve the patients ability to ambulate with less pain    Min Type Additional Details       [] Estim: []Att   []Unatt    []TENS instruct                  []IFC  []Premod   []NMES                     []Other:  []w/US   []w/ice   []w/heat  Position:  Location:       []  Traction: [] Cervical       []Lumbar                       [] Prone          []Supine                       []Intermittent   []Continuous Lbs:  [] before manual  [] after manual  []w/heat    []  Ultrasound: []Continuous   [] Pulsed                       at: []1MHz   []3MHz Location:  W/cm2:    [] Paraffin         Location:   []w/heat    []  Ice     []  Heat  []  Ice massage Position:  Location:    []  Laser  []  Other: Position:  Location:   15   [x]  Vasopneumatic Device Pressure:       [x] lo [] med [] hi   Temperature: 36     [x] Skin assessment post-treatment:  [x]intact [x]redness- no adverse reaction    []redness  adverse reaction:         30 min Therapeutic Exercise:  [x] See flow sheet :   Rationale: increase ROM, increase strength, improve coordination, improve balance and increase proprioception to improve the patients ability to return to N walking     15 min Manual Therapy:  Soft tissue massage and myofascial release to L gastroc and soleus and L foot    Rationale: decrease pain, increase ROM, increase tissue extensibility, decrease edema  and correct positional vertigo  to improve the patients ability to return to N amb            With   [] TE   [] TA   [] neuro   [] other: Patient Education: [x] Review HEP    [] Progressed/Changed HEP based on:   [] positioning   [] body mechanics   [] transfers   [] heat/ice application    [] other:      Other Objective/Functional Measures: edema present L heel and arch of L foot     Pain Level (0-10 scale) post treatment: 'a bit better'    ASSESSMENT/Changes in Function:   Pt was compliant with recommendations given at initial visit. She has less hip and back pain when wearing the R foot boot elevator on her shoe. Patient will continue to benefit from skilled PT services to modify and progress therapeutic interventions, address functional mobility deficits, address ROM deficits, address strength deficits, analyze and address soft tissue restrictions, analyze and cue movement patterns, analyze and modify body mechanics/ergonomics, assess and modify postural abnormalities, address imbalance/dizziness and instruct in home and community integration to attain remaining goals. [x]  See Plan of Care  []  See progress note/recertification  []  See Discharge Summary         Progress towards goals / Updated goals:  Pt was compliant with HEP since initial visit.     PLAN  []  Upgrade activities as tolerated     [x]  Continue plan of care  [x]  Update interventions per flow sheet       []  Discharge due to:_  []  Other:_      Mundo Mckeon, PT, DPT, [facility]   6/16/2020

## 2020-06-18 ENCOUNTER — HOSPITAL ENCOUNTER (OUTPATIENT)
Dept: PHYSICAL THERAPY | Age: 40
Discharge: HOME | End: 2020-06-18
Payer: COMMERCIAL

## 2020-06-18 PROCEDURE — 97140 MANUAL THERAPY 1/> REGIONS: CPT | Performed by: PHYSICAL MEDICINE & REHABILITATION

## 2020-06-18 PROCEDURE — 97016 VASOPNEUMATIC DEVICE THERAPY: CPT | Performed by: PHYSICAL MEDICINE & REHABILITATION

## 2020-06-18 PROCEDURE — 97110 THERAPEUTIC EXERCISES: CPT | Performed by: PHYSICAL MEDICINE & REHABILITATION

## 2020-06-18 NOTE — PROGRESS NOTES
PT DAILY TREATMENT NOTE - [facility] 2-15    Patient Name: Triston Le  Date:2020  : 1980  [x]  Patient  Verified  Payor: [employer] / Plan: [facility] MEDICAL [place] [facility] / Product Type: Commerical /    In time:1200 pm  Out time:100 pm  Total Treatment Time (min): 60  Total Timed Codes (min): 45  1:1 Treatment Time ( only): -   Visit #: 3      Treatment Area: Left leg pain [M79.605]    SUBJECTIVE  Pain Level (0-10 scale): 0/10  Any medication changes, allergies to medications, adverse drug reactions, diagnosis change, or new procedure performed?: [x] No    [] Yes (see summary sheet for update)  Subjective functional status/changes:   [] No changes reported  Patient reported that she was pretty sore/achy after last visit but the soreness went away the next morning.      OBJECTIVE    Modality rationale: decrease edema, decrease inflammation and decrease pain to improve the patients ability to ADLs, standing and walking tolerance   Min Type Additional Details    [] Estim: []Att   []Unatt        []TENS instruct                  []IFC  []Premod   []NMES                     []Other:  []w/US   []w/ice   []w/heat  Position:  Location:    []  Traction: [] Cervical       []Lumbar                       [] Prone          []Supine                       []Intermittent   []Continuous Lbs:  [] before manual  [] after manual  []w/heat    []  Ultrasound: []Continuous   [] Pulsed at:                           []1MHz   []3MHz Location:  W/cm2:    [] Paraffin         Location:   []w/heat    []  Ice     []  Heat  []  Ice massage Position:  Location:    []  Laser  []  Other: Position:  Location:   15   [x]  Vasopneumatic Device Pressure:       [] lo [x] med [] hi   Temperature: 34     [x] Skin assessment post-treatment:  [x]intact []redness- no adverse reaction    []redness  adverse reaction:     30 min Therapeutic Exercise:  [x] See flow sheet :   Rationale: increase ROM, increase strength, improve coordination and increase proprioception to improve the patients ability to ADLs, standing and walking tolerance    15 min Manual Therapy:  Effleurage massage to heel, gentle STM to medial calf   Rationale: decrease pain, increase ROM, increase tissue extensibility and decrease edema  to improve the patients ability to ADLs, standing and walking tolerance          With   [x] TE   [] TA   [] neuro   [] other: Patient Education: [x] Review HEP    [] Progressed/Changed HEP based on:   [] positioning   [] body mechanics   [] transfers   [] heat/ice application    [] other:      Other Objective/Functional Measures:   Great tolerance with weight shifting today with some limited ROM noted. No increase in pain with today's exercises but did have mod fatigue. Min swelling present on heel or around ankle. Continued mod to max TTP along medical calf. Pain Level (0-10 scale) post treatment: 0/10    ASSESSMENT/Changes in Function:     Patient will continue to benefit from skilled PT services to modify and progress therapeutic interventions, address functional mobility deficits, address ROM deficits, address strength deficits, analyze and address soft tissue restrictions, analyze and cue movement patterns, analyze and modify body mechanics/ergonomics and assess and modify postural abnormalities to attain remaining goals. []  See Plan of Care  []  See progress note/recertification  []  See Discharge Summary         Progress towards goals / Updated goals:  Patient is demonstrating good progress towards goals with good standing tolerance noted today. Add standing weight shifting for HEP if patient isn't too sore/achy tomorrow.      PLAN  [x]  Upgrade activities as tolerated     [x]  Continue plan of care  [x]  Update interventions per flow sheet       []  Discharge due to:_  []  Other:_      Lola Singh PTA, [facility], CPT  6/18/2020

## 2020-06-22 ENCOUNTER — HOSPITAL ENCOUNTER (OUTPATIENT)
Dept: PHYSICAL THERAPY | Age: 40
Discharge: HOME | End: 2020-06-22
Payer: COMMERCIAL

## 2020-06-22 PROCEDURE — 97110 THERAPEUTIC EXERCISES: CPT | Performed by: PHYSICAL THERAPIST

## 2020-06-22 PROCEDURE — 97014 ELECTRIC STIMULATION THERAPY: CPT | Performed by: PHYSICAL THERAPIST

## 2020-06-22 PROCEDURE — 97140 MANUAL THERAPY 1/> REGIONS: CPT | Performed by: PHYSICAL THERAPIST

## 2020-06-22 NOTE — PROGRESS NOTES
PT DAILY TREATMENT NOTE - [facility] 2-15    Patient Name: Fabio Rojas  Date:2020  : 1980  [x]  Patient  Verified  Payor: MEDICAL [facility] / Plan: [facility] MEDICAL [place] [address] [facility] / Product Type: Commerical /    In time:1:30 pm  Out time:2:30 pm  Total Treatment Time (min): 60  Total Timed Codes (min): 45  Visit #: 4      Treatment Area: Left leg pain [M79.515]    SUBJECTIVE  Pain Level (0-10 scale): 0/10  Any medication changes, allergies to medications, adverse drug reactions, diagnosis change, or new procedure performed?: [x] No    [] Yes (see summary sheet for update)  Subjective functional status/changes:   [] No changes reported  Patient reports that she is definitely is still having heel discomfort. She feels like overall she is doing better but that there is stiffness when she wakes in the morning.     OBJECTIVE    Modality rationale: decrease edema, decrease inflammation and decrease pain to improve the patients ability to ADLs, standing and walking tolerance   Min Type Additional Details   15 [x] Estim: []Att   [x]Unatt        []TENS instruct                  []IFC  [x]Premod   []NMES                     []Other:  []w/US   [x]w/ice   []w/heat  Position: L mid gastroc and L heel  Location:    []  Traction: [] Cervical       []Lumbar                       [] Prone          []Supine                       []Intermittent   []Continuous Lbs:  [] before manual  [] after manual  []w/heat    []  Ultrasound: []Continuous   [] Pulsed at:                           []1MHz   []3MHz Location:  W/cm2:    [] Paraffin         Location:   []w/heat    []  Ice     []  Heat  []  Ice massage Position:  Location:    []  Laser  []  Other: Position:  Location:      []  Vasopneumatic Device Pressure:       [] lo [] med [] hi   Temperature:      [x] Skin assessment post-treatment:  [x]intact []redness- no adverse reaction    []redness  adverse reaction:     30 min Therapeutic Exercise:  [x] See flow sheet :   Rationale: increase ROM, increase strength, improve coordination and increase proprioception to improve the patients ability to ADLs, standing and walking tolerance    15 min Manual Therapy:  Soft tissue massage and myofascial release to bilateral gastroc/soleus, inverters and everters of L ankle   Rationale: decrease pain, increase ROM, increase tissue extensibility and decrease edema  to improve the patients ability to ADLs, standing and walking tolerance          With   [x] TE   [] TA   [] neuro   [] other: Patient Education: [x] Review HEP    [] Progressed/Changed HEP based on:   [] positioning   [] body mechanics   [] transfers   [] heat/ice application    [] other:      Other Objective/Functional Measures:   Reduced + pn to palpation at medial calf as well as attachment of L achilles       Pain Level (0-10 scale) post treatment: 0/10    ASSESSMENT/Changes in Function:     Patient will continue to benefit from skilled PT services to modify and progress therapeutic interventions, address functional mobility deficits, address ROM deficits, address strength deficits, analyze and address soft tissue restrictions, analyze and cue movement patterns, analyze and modify body mechanics/ergonomics and assess and modify postural abnormalities to attain remaining goals.      []  See Plan of Care  []  See progress note/recertification  []  See Discharge Summary         Progress towards goals / Updated goals:  WB on L LE with hip strategy and no LOB    PLAN  [x]  Upgrade activities as tolerated     [x]  Continue plan of care  [x]  Update interventions per flow sheet       []  Discharge due to:_  []  Other:_      Ev Nunez, PT, DPT, [facility]    6/22/2020

## 2020-06-29 ENCOUNTER — HOSPITAL ENCOUNTER (OUTPATIENT)
Dept: PHYSICAL THERAPY | Age: 40
Discharge: HOME | End: 2020-06-29
Payer: COMMERCIAL

## 2020-06-29 PROCEDURE — 97110 THERAPEUTIC EXERCISES: CPT | Performed by: PHYSICAL THERAPIST

## 2020-06-29 PROCEDURE — 97014 ELECTRIC STIMULATION THERAPY: CPT | Performed by: PHYSICAL THERAPIST

## 2020-06-29 PROCEDURE — 97112 NEUROMUSCULAR REEDUCATION: CPT | Performed by: PHYSICAL THERAPIST

## 2020-06-29 PROCEDURE — 97140 MANUAL THERAPY 1/> REGIONS: CPT | Performed by: PHYSICAL THERAPIST

## 2020-06-29 NOTE — PROGRESS NOTES
PT DAILY TREATMENT NOTE - [facility] 2-15    Patient Name: Yeni Gamino  Date:2020  : 1980  [x]  Patient  Verified  Payor: MEDICAL [facility] / Plan: [facility] MEDICAL [place] [address] / Product Type: Commerical /    In time: 2:30 pm  Out time: 3:30 pm  Total Treatment Time (min): 60  Total Timed Codes (min): 45  Visit #: 5      Treatment Area: Left leg pain [M79.055]    SUBJECTIVE  Pain Level (0-10 scale): 1/10  Any medication changes, allergies to medications, adverse drug reactions, diagnosis change, or new procedure performed?: [x] No    [] Yes (see summary sheet for update)  Subjective functional status/changes:   [] No changes reported  Patient reports that she is feeling much better. She reports less pain and that her heel is less uncomfortable overall. She is walking a bit better.      OBJECTIVE    Modality rationale: decrease edema, decrease inflammation and decrease pain to improve the patients ability to ADLs, standing and walking tolerance   Min Type Additional Details   15 [x] Estim: []Att   [x]Unatt        []TENS instruct                  []IFC  [x]Premod   []NMES                     []Other:  []w/US   [x]w/ice   []w/heat  Position: L mid gastroc and L heel  Location:    []  Traction: [] Cervical       []Lumbar                       [] Prone          []Supine                       []Intermittent   []Continuous Lbs:  [] before manual  [] after manual  []w/heat    []  Ultrasound: []Continuous   [] Pulsed at:                           []1MHz   []3MHz Location:  W/cm2:    [] Paraffin         Location:   []w/heat    []  Ice     []  Heat  []  Ice massage Position:  Location:    []  Laser  []  Other: Position:  Location:      []  Vasopneumatic Device Pressure:       [] lo [] med [] hi   Temperature:      [x] Skin assessment post-treatment:  [x]intact []redness- no adverse reaction    []redness  adverse reaction:     30 min Therapeutic Exercise:  [x] See flow sheet :   Rationale: increase ROM, increase strength, improve coordination and increase proprioception to improve the patients ability to ADLs, standing and walking tolerance    15 min Manual Therapy:  Soft tissue massage and myofascial release to bilateral gastroc/soleus, inverters and everters of L ankle and plantar surface of L foot   Rationale: decrease pain, increase ROM, increase tissue extensibility and decrease edema  to improve the patients ability to ADLs, standing and walking tolerance          With   [x] TE   [] TA   [] neuro   [] other: Patient Education: [x] Review HEP    [] Progressed/Changed HEP based on:   [] positioning   [] body mechanics   [] transfers   [] heat/ice application    [] other:      Other Objective/Functional Measures:   Edema reduced L heel  Reduced pn to palpation of plantaris mm belly    Pain Level (0-10 scale) post treatment: 0/10      ASSESSMENT/Changes in Function:     Patient will continue to benefit from skilled PT services to modify and progress therapeutic interventions, address functional mobility deficits, address ROM deficits, address strength deficits, analyze and address soft tissue restrictions, analyze and cue movement patterns, analyze and modify body mechanics/ergonomics and assess and modify postural abnormalities to attain remaining goals. []  See Plan of Care  []  See progress note/recertification  []  See Discharge Summary         Progress towards goals / Updated goals:  amb with shoes with reduced heel off, antalgia, reduced PF moment L LE during R stance phase of gait.  With shoes tightened    PLAN  [x]  Upgrade activities as tolerated     [x]  Continue plan of care  [x]  Update interventions per flow sheet       []  Discharge due to:_  []  Other:_      Rhonda Tompkins, PT, DPT, [facility]    6/29/2020

## 2020-07-01 ENCOUNTER — HOSPITAL ENCOUNTER (OUTPATIENT)
Dept: PHYSICAL THERAPY | Age: 40
Discharge: HOME | End: 2020-07-01
Payer: COMMERCIAL

## 2020-07-01 PROCEDURE — 97140 MANUAL THERAPY 1/> REGIONS: CPT | Performed by: PHYSICAL THERAPIST

## 2020-07-01 PROCEDURE — 97110 THERAPEUTIC EXERCISES: CPT | Performed by: PHYSICAL THERAPIST

## 2020-07-01 NOTE — PROGRESS NOTES
PT DAILY TREATMENT NOTE - [facility] 2-15    Patient Name: Yeni Gamino  Date:2020  : 1980  [x]  Patient  Verified  Payor: MEDICAL [facility] / Plan: [facility] MEDICAL [place] [address] [facility] / Product Type: Commerical /    In time: 12:30 pm  Out time: 1:30 pm  Total Treatment Time (min): 60  Total Timed Codes (min): 40  Visit #: 6      Treatment Area: Left leg pain [M79.845]    SUBJECTIVE  Pain Level (0-10 scale): 0/10  Any medication changes, allergies to medications, adverse drug reactions, diagnosis change, or new procedure performed?: [x] No    [] Yes (see summary sheet for update)  Subjective functional status/changes:   [] No changes reported  Patient reports that she was sore after last visit but no pain present.     OBJECTIVE    Modality rationale: decrease edema, decrease inflammation and decrease pain to improve the patients ability to ADLs, standing and walking tolerance   Min Type Additional Details    [] Estim: []Att   []Unatt        []TENS instruct                  []IFC  []Premod   []NMES                     []Other:  []w/US   []w/ice   []w/heat  Position:  Location:    []  Traction: [] Cervical       []Lumbar                       [] Prone          []Supine                       []Intermittent   []Continuous Lbs:  [] before manual  [] after manual  []w/heat    []  Ultrasound: []Continuous   [] Pulsed at:                           []1MHz   []3MHz Location:  W/cm2:    [] Paraffin         Location:   []w/heat   10 [x]  Ice     []  Heat  []  Ice massage Position: prone  Location: L calf     []  Laser  []  Other: Position:  Location:      []  Vasopneumatic Device Pressure:       [] lo [] med [] hi   Temperature:      [x] Skin assessment post-treatment:  [x]intact [x]redness- no adverse reaction    []redness  adverse reaction:     30 min Therapeutic Exercise:  [x] See flow sheet :   Rationale: increase ROM, increase strength, improve coordination and increase proprioception to improve the patients ability to ADLs, standing and walking tolerance    10 min Manual Therapy:  Soft tissue massage and myofascial release to bilateral gastroc/soleus, inverters and everters of L ankle and plantar surface of L foot   Rationale: decrease pain, increase ROM, increase tissue extensibility and decrease edema  to improve the patients ability to ADLs, standing and walking tolerance          With   [x] TE   [] TA   [] neuro   [] other: Patient Education: [x] Review HEP    [] Progressed/Changed HEP based on:   [] positioning   [] body mechanics   [] transfers   [] heat/ice application    [] other:      Other Objective/Functional Measures:   L SLS on challenged surface with hip strategy and no LOB    Pain Level (0-10 scale) post treatment: 0/10      ASSESSMENT/Changes in Function:     Patient will continue to benefit from skilled PT services to modify and progress therapeutic interventions, address functional mobility deficits, address ROM deficits, address strength deficits, analyze and address soft tissue restrictions, analyze and cue movement patterns, analyze and modify body mechanics/ergonomics and assess and modify postural abnormalities to attain remaining goals.      []  See Plan of Care  []  See progress note/recertification  []  See Discharge Summary         Progress towards goals / Updated goals:  5 minutes of biking on recumbent bike with no pain present  Ambulation in shoe with improved heel off present    PLAN  [x]  Upgrade activities as tolerated     [x]  Continue plan of care  [x]  Update interventions per flow sheet       []  Discharge due to:_  [x]  Other:_  MD f/u next Wednesday     José Miguel Gonzalez, PT, DPT, [facility]    7/1/2020

## 2020-07-06 ENCOUNTER — APPOINTMENT (OUTPATIENT)
Dept: PHYSICAL THERAPY | Age: 40
End: 2020-07-06

## 2020-07-09 ENCOUNTER — HOSPITAL ENCOUNTER (OUTPATIENT)
Dept: PHYSICAL THERAPY | Age: 40
Discharge: HOME | End: 2020-07-09
Payer: COMMERCIAL

## 2020-07-09 PROCEDURE — 97014 ELECTRIC STIMULATION THERAPY: CPT | Performed by: PHYSICAL THERAPIST

## 2020-07-09 PROCEDURE — 97112 NEUROMUSCULAR REEDUCATION: CPT | Performed by: PHYSICAL THERAPIST

## 2020-07-09 PROCEDURE — 97110 THERAPEUTIC EXERCISES: CPT | Performed by: PHYSICAL THERAPIST

## 2020-07-09 NOTE — PROGRESS NOTES
PT DAILY TREATMENT NOTE - [facility] 2-15    Patient Name: Charmayne Chary  Date:2020  : 1980  [x]  Patient  Verified  Payor: MEDICAL [employer] / Plan: [facility] MEDICAL [address] / Product Type: Commerical /    In time: 11:00 am  Out time: 12:00 pm  Total Treatment Time (min): 60  Total Timed Codes (min): 40  Visit #: 7      Treatment Area: Left leg pain [M79.605]    SUBJECTIVE  Pain Level (0-10 scale): 3/10  Any medication changes, allergies to medications, adverse drug reactions, diagnosis change, or new procedure performed?: [x] No    [] Yes (see summary sheet for update)  Subjective functional status/changes:   [] No changes reported  Patient reports that her doctor said that she didn't have to wear the boot anymore and that she should do what is comfortable to do. She stopped wearing the boot yesterday afternoon and she is now in aching pain. She feels like she did too much and is worried that she needs to take a step back.      OBJECTIVE    Modality rationale: decrease edema, decrease inflammation and decrease pain to improve the patients ability to ADLs, standing and walking tolerance   Min Type Additional Details   15 [] Estim: []Att   []Unatt        []TENS instruct                  []IFC  []Premod   []NMES                     []Other:  []w/US   []w/ice   []w/heat  Position:  Location:    []  Traction: [] Cervical       []Lumbar                       [] Prone          []Supine                       []Intermittent   []Continuous Lbs:  [] before manual  [] after manual  []w/heat    []  Ultrasound: []Continuous   [] Pulsed at:                           []1MHz   []3MHz Location:  W/cm2:    [] Paraffin         Location:   []w/heat   15 [x]  Ice     []  Heat  []  Ice massage Position: supine  Location: L calf and L foot/heel    []  Laser  []  Other: Position:  Location:      []  Vasopneumatic Device Pressure:       [] lo [] med [] hi   Temperature:      [x] Skin assessment post-treatment:  [x]intact [x]redness- no adverse reaction    []redness  adverse reaction:     30 min Therapeutic Exercise:  [x] See flow sheet :   Rationale: increase ROM, increase strength, improve coordination and increase proprioception to improve the patients ability to ADLs, standing and walking tolerance    10 min Neuromuscular Re-education:  Gait, balance and ADL activities and demonstration   Rationale: decrease pain, increase ROM, increase tissue extensibility and decrease edema  to improve the patients ability to ADLs, standing and walking tolerance          With   [x] TE   [] TA   [x] neuro   [] other: Patient Education: [x] Review HEP    [] Progressed/Changed HEP based on:   [] positioning   [] body mechanics   [] transfers   [] heat/ice application    [] other:      Other Objective/Functional Measures:   L SLS on challenged surface with hip strategy and no LOB and no pain present     Pain Level (0-10 scale) post treatment: 1/10      ASSESSMENT/Changes in Function:     Patient will continue to benefit from skilled PT services to modify and progress therapeutic interventions, address functional mobility deficits, address ROM deficits, address strength deficits, analyze and address soft tissue restrictions, analyze and cue movement patterns, analyze and modify body mechanics/ergonomics and assess and modify postural abnormalities to attain remaining goals.      []  See Plan of Care  []  See progress note/recertification  []  See Discharge Summary         Progress towards goals / Updated goals:  11 minutes of biking on recumbent bike with no pain present  Ambulation in shoe with pain present secondary to overuse yesterday    PLAN  [x]  Upgrade activities as tolerated     [x]  Continue plan of care  [x]  Update interventions per flow sheet       []  Discharge due to:_  [x]  Other:_  Pt was provided information and timing for slow weaning out of boot wear    Wanda Ibarra, PT, DPT, [facility]    7/9/2020

## 2020-07-14 ENCOUNTER — HOSPITAL ENCOUNTER (OUTPATIENT)
Dept: PHYSICAL THERAPY | Age: 40
Discharge: HOME | End: 2020-07-14
Payer: COMMERCIAL

## 2020-07-14 PROCEDURE — 97140 MANUAL THERAPY 1/> REGIONS: CPT | Performed by: PHYSICAL MEDICINE & REHABILITATION

## 2020-07-14 PROCEDURE — 97016 VASOPNEUMATIC DEVICE THERAPY: CPT | Performed by: PHYSICAL MEDICINE & REHABILITATION

## 2020-07-14 PROCEDURE — 97112 NEUROMUSCULAR REEDUCATION: CPT | Performed by: PHYSICAL MEDICINE & REHABILITATION

## 2020-07-14 PROCEDURE — 97110 THERAPEUTIC EXERCISES: CPT | Performed by: PHYSICAL MEDICINE & REHABILITATION

## 2020-07-14 NOTE — PROGRESS NOTES
PT DAILY TREATMENT NOTE - [facility] 2-15    Patient Name: Eryn Fowler  Date:2020  : 1980  [x]  Patient  Verified  Payor: MEDICAL [facility] / Plan: [facility] MEDICAL [place] [address] / Product Type: Commerical /    In time:2:00pm  Out time:3:00pm  Total Treatment Time (min): 60  Total Timed Codes (min): 45  1:1 Treatment Time ( [address] only): --   Visit #: 8      Treatment Area: Left leg pain [M79.605]    SUBJECTIVE  Pain Level (0-10 scale): 2  Any medication changes, allergies to medications, adverse drug reactions, diagnosis change, or new procedure performed?: [x] No    [] Yes (see summary sheet for update)  Subjective functional status/changes:   [] No changes reported  Pt. States that she is not experiencing any real pain, but more \"achy\" feeling. She thinks that she may be over doing it at home. OBJECTIVE    Modality rationale: decrease edema, decrease inflammation and decrease pain to improve the patients ability to perform daily activities.    Min Type Additional Details    [] Estim: []Att   []Unatt        []TENS instruct                  []IFC  []Premod   []NMES                     []Other:  []w/US   []w/ice   []w/heat  Position:  Location:    []  Traction: [] Cervical       []Lumbar                       [] Prone          []Supine                       []Intermittent   []Continuous Lbs:  [] before manual  [] after manual  []w/heat    []  Ultrasound: []Continuous   [] Pulsed at:                           []1MHz   []3MHz Location:  W/cm2:    [] Paraffin         Location:   []w/heat    []  Ice     []  Heat  []  Ice massage Position:  Location:    []  Laser  []  Other: Position:  Location:   15   []  Vasopneumatic Device Pressure:       [] lo [x] med [] hi   Temperature:      [x] Skin assessment post-treatment:  [x]intact []redness- no adverse reaction    []redness  adverse reaction:     25 min Therapeutic Exercise:  [x] See flow sheet :   Rationale: increase ROM, increase strength, improve coordination and improve balance to improve the patients ability to perform daily activities such as walking and ADLs with decreased pain. 10 min Neuromuscular Re-education:  [x]  See flow sheet :   Rationale: improve coordination and improve balance  to improve the patients ability to have normal gait and balance strategies while decreasing pain in L LE.     10 min Manual Therapy: STM to gastroc    Rationale: decrease pain, increase tissue extensibility and decrease trigger points to improve the patients ability to perform daily activities. With   [x] TE   [] TA   [x] neuro   [] other: Patient Education: [x] Review HEP    [] Progressed/Changed HEP based on:   [] positioning   [] body mechanics   [] transfers   [] heat/ice application    [] other:      Other Objective/Functional Measures: Was able to increase activity difficulty today with some standing activities. She was able to perform SLS on foam while performing ball toss to strengthen ankle intrinsics and work on balance. Also tolerated BOSU step ups to focus on balance. All other exercises were performed with no increase in pain. Pain Level (0-10 scale) post treatment: 2    ASSESSMENT/Changes in Function:     Patient will continue to benefit from skilled PT services to modify and progress therapeutic interventions, address functional mobility deficits, address ROM deficits, address strength deficits and analyze and modify body mechanics/ergonomics to attain remaining goals. []  See Plan of Care  []  See progress note/recertification  []  See Discharge Summary         Progress towards goals / Updated goals:  Would continue to benefit from skilled PT services to address L LE strengthening and work on balance strategies. Pt. Is able to increase activity difficulty from last visit without increase in pain and is working towards goals.  Educated patient on monitoring standing and walking times/pain to ensure patient isn't over doing it at home.      PLAN  [x]  Upgrade activities as tolerated     [x]  Continue plan of care  [x]  Update interventions per flow sheet       []  Discharge due to:_  []  Other:_      Joseph Guerrero PTA, OPTA, CPT  7/14/2020     1- VC, 1- MT, 1-NM, 1-TE

## 2020-07-21 ENCOUNTER — HOSPITAL ENCOUNTER (OUTPATIENT)
Dept: PHYSICAL THERAPY | Age: 40
Discharge: HOME | End: 2020-07-21
Payer: COMMERCIAL

## 2020-07-21 PROCEDURE — 97014 ELECTRIC STIMULATION THERAPY: CPT | Performed by: PHYSICAL MEDICINE & REHABILITATION

## 2020-07-21 PROCEDURE — 97110 THERAPEUTIC EXERCISES: CPT | Performed by: PHYSICAL MEDICINE & REHABILITATION

## 2020-07-21 PROCEDURE — 97140 MANUAL THERAPY 1/> REGIONS: CPT | Performed by: PHYSICAL MEDICINE & REHABILITATION

## 2020-07-21 NOTE — PROGRESS NOTES
PT DAILY TREATMENT NOTE - [facility] 2-15    Patient Name: Cornelia Gauthier  Date:2020  : 1980  [x]  Patient  Verified  Payor: MEDICAL [facility] / Plan: [facility] MEDICAL [address] / Product Type: Commerical /    In time:2:00pm  Out time:3:05pm  Total Treatment Time (min): 65  Total Timed Codes (min): 50  1:1 Treatment Time ( only): ---   Visit #: 9      Treatment Area: Left leg pain [M79.605]    SUBJECTIVE  Pain Level (0-10 scale): 3/10  Any medication changes, allergies to medications, adverse drug reactions, diagnosis change, or new procedure performed?: [x] No    [] Yes (see summary sheet for update)  Subjective functional status/changes:   [] No changes reported  Pt. States that she has been experiencing constant pain in the heel for the past few days. This pain usually subsides once she gets moving around, but recently has not. OBJECTIVE    Modality rationale: decrease edema, decrease inflammation, decrease pain and increase tissue extensibility to improve the patients ability to perform daily activities.    Min Type Additional Details   15 [x] Estim: []Att   [x]Unatt        []TENS instruct                  []IFC  [x]Premod   []NMES                     []Other:  []w/US   []w/ice   [x]w/heat  Position: supine  Location: L arch/heel & L calf    []  Traction: [] Cervical       []Lumbar                       [] Prone          []Supine                       []Intermittent   []Continuous Lbs:  [] before manual  [] after manual  []w/heat    []  Ultrasound: []Continuous   [] Pulsed at:                           []1MHz   []3MHz Location:  W/cm2:    [] Paraffin         Location:   []w/heat    []  Ice     []  Heat  []  Ice massage Position:  Location:    []  Laser  []  Other: Position:  Location:      []  Vasopneumatic Device Pressure:       [] lo [] med [] hi   Temperature:      [x] Skin assessment post-treatment:  [x]intact []redness- no adverse reaction    []redness  adverse reaction:     40 min Therapeutic Exercise:  [x] See flow sheet :   Rationale: increase ROM, increase strength and improve coordination to improve the patients ability to perform daily activities without increase in pain. 10 min Manual Therapy:  STM to L heel/calf   Rationale: decrease pain, increase ROM and increase tissue extensibility to improve the patients ability to perform daily activities. With   [x] TE   [] TA   [] neuro   [] other: Patient Education: [x] Review HEP    [] Progressed/Changed HEP based on:   [] positioning   [] body mechanics   [] transfers   [] heat/ice application    [] other:      Other Objective/Functional Measures: Pt. Was able to perform all activities with no increase in heel pain. Kept all exercises the same and focused on increasing volume of calf stretching to alleviate heel pain patient reported at beginning of session. Pain Level (0-10 scale) post treatment: 2/10    ASSESSMENT/Changes in Function:     Patient will continue to benefit from skilled PT services to modify and progress therapeutic interventions, address functional mobility deficits, address ROM deficits, address strength deficits and analyze and address soft tissue restrictions to attain remaining goals. []  See Plan of Care  []  See progress note/recertification  []  See Discharge Summary         Progress towards goals / Updated goals:  Pt. Would continue to benefit from skilled PT to continue to stretch tight L gastroc and strengthen L LE to help decrease increase in pain with daily activities.      PLAN  [x]  Upgrade activities as tolerated     [x]  Continue plan of care  [x]  Update interventions per flow sheet       []  Discharge due to:_  []  Other:_      AREN Zapien 7/21/2020     1- MT, 2- TE, 1- ES

## 2020-07-27 ENCOUNTER — HOSPITAL ENCOUNTER (OUTPATIENT)
Dept: PHYSICAL THERAPY | Age: 40
Discharge: HOME | End: 2020-07-27
Payer: COMMERCIAL

## 2020-07-27 PROCEDURE — 97112 NEUROMUSCULAR REEDUCATION: CPT | Performed by: PHYSICAL THERAPIST

## 2020-07-27 PROCEDURE — 97530 THERAPEUTIC ACTIVITIES: CPT | Performed by: PHYSICAL THERAPIST

## 2020-07-27 PROCEDURE — 97140 MANUAL THERAPY 1/> REGIONS: CPT | Performed by: PHYSICAL THERAPIST

## 2020-07-27 NOTE — PROGRESS NOTES
PT DAILY TREATMENT NOTE - [facility] 2-15    Patient Name: Katia Muro  Date:2020  : 1980  [x]  Patient  Verified  Payor: MEDICAL [facility] / Plan: [facility] MEDICAL [place] [address] / Product Type: Commerical /    In time: 2:30pm  Out time: 3:35pm  Total Treatment Time (min): 65  Total Timed Codes (min): 60  Visit #: 10      Treatment Area: Left leg pain [M79.605]    SUBJECTIVE  Pain Level (0-10 scale): 4/10  Any medication changes, allergies to medications, adverse drug reactions, diagnosis change, or new procedure performed?: [x] No    [] Yes (see summary sheet for update)  Subjective functional status/changes:   [] No changes reported  Pt states that she continues to have heel pain and it gets worse throughout the day. She wears her Crocs when she is home during the day. OBJECTIVE      15 min Therapeutic Activities:  HARSH positioning and POC relative to [facility]   Rationale: increase ROM, increase strength and improve coordination to improve the patients ability to perform daily activities without increase in pain. 30 min Neuromuscular Re-education:  HARSH intervention see flow sheet. HARSH evaluation    Rationale: increase ROM, increase strength and improve coordination to improve the patients ability to perform daily activities without increase in pain. 15 min Manual Therapy:  gd III and IV calcaneal mobilizations of eversion, DF and PF glides to increase ROM   Rationale: decrease pain, increase ROM and increase tissue extensibility to improve the patients ability to perform daily activities.      With   [] TE   [x] TA   [x] neuro   [] other: Patient Education: [x] Review HEP    [] Progressed/Changed HEP based on:   [] positioning   [] body mechanics   [] transfers   [] heat/ice application    [x] other: HARSH exercises and HARSH treatment approach      Other Objective/Functional Measures:   + R HGIR  + L horizontal abd  + PADT bilaterally  + HADDT bilaterally  Level 2 squat  Limited L hip IR in seated  Apical expansion limited bilaterally  L rib flare  HaDLT R Level 2  HaDLT L Level 2 both with v.c. needed. Pain Level (0-10 scale) post treatment: 2/10    ASSESSMENT/Changes in Function:     Patient will continue to benefit from skilled PT services to modify and progress therapeutic interventions, address functional mobility deficits, address ROM deficits, address strength deficits and analyze and address soft tissue restrictions to attain remaining goals. []  See Plan of Care  []  See progress note/recertification  []  See Discharge Summary         Progress towards goals / Updated goals: We will begin a shift in treatment paradigm to include HARSH intervention at this time.      PLAN  [x]  Upgrade activities as tolerated     [x]  Continue plan of care  [x]  Update interventions per flow sheet       []  Discharge due to:_  [x]  Other:_  D/c PowerSteps, change to shoes with less drop in them    Macie Floyd, PT, DPT, [facility] 7/27/2020

## 2020-08-03 ENCOUNTER — HOSPITAL ENCOUNTER (OUTPATIENT)
Dept: PHYSICAL THERAPY | Age: 40
Discharge: HOME | End: 2020-08-03
Payer: COMMERCIAL

## 2020-08-03 PROCEDURE — 97112 NEUROMUSCULAR REEDUCATION: CPT | Performed by: PHYSICAL THERAPIST

## 2020-08-03 NOTE — PROGRESS NOTES
PT DAILY TREATMENT NOTE - [facility] 2-15    Patient Name: Nacho Owens  Date:8/3/2020  : 1980  [x]  Patient  Verified  Payor: MEDICAL [employer] / Plan: [facility] MEDICAL [place] [address] / Product Type: Commerical /    In time: 2:30pm  Out time: 3:20 pm  Total Treatment Time (min): 50  Total Timed Codes (min): 45  Visit #: 11      Treatment Area: Left leg pain [M79.605]    SUBJECTIVE  Pain Level (0-10 scale): 3-410  Any medication changes, allergies to medications, adverse drug reactions, diagnosis change, or new procedure performed?: [x] No    [] Yes (see summary sheet for update)  Subjective functional status/changes:   [] No changes reported  Pt states that she continues to have heel pain when waking. She has walking pneumonia and has been very tired and sleeping a lot. She has been able to do her exercises. Her heel pain is unchanged. OBJECTIVE    45 min Neuromuscular Re-education:  HARSH intervention see flow sheet. Rationale: increase ROM, increase strength and improve coordination to improve the patients ability to perform daily activities without increase in pain. With   [] TE   [x] TA   [x] neuro   [] other: Patient Education: [x] Review HEP    [] Progressed/Changed HEP based on:   [] positioning   [] body mechanics   [] transfers   [] heat/ice application    [x] other: HARSH exercises and HARSH treatment approach      Other Objective/Functional Measures:   -  HGIR bilaterally  - L horizontal abd  + PADT bilaterally  + HADDT bilaterally  Apical expansion limited bilaterally - improved    Pain Level (0-10 scale) post treatment: 2/10    ASSESSMENT/Changes in Function:     Patient will continue to benefit from skilled PT services to modify and progress therapeutic interventions, address functional mobility deficits, address ROM deficits, address strength deficits and analyze and address soft tissue restrictions to attain remaining goals.      []  See Plan of Care  []  See progress note/recertification  []  See Discharge Summary         Progress towards goals / Updated goals: progressing toward all   1) Patient will demonstrate Negative Hruska Adduction Drop Test   2) Patient will demonstrate independence with HEP  3) Patient will demonstrate greater than Grade II on Hruska Adduction Lift Test    PLAN  [x]  Upgrade activities as tolerated     [x]  Continue plan of care  [x]  Update interventions per flow sheet       []  Discharge due to:_  [x]  Other:_ cont with HARSH intervention      Yuni Mauro, PT, DPT, [facility] 8/3/2020

## 2020-08-10 ENCOUNTER — HOSPITAL ENCOUNTER (OUTPATIENT)
Dept: PHYSICAL THERAPY | Age: 40
Discharge: HOME | End: 2020-08-10
Payer: COMMERCIAL

## 2020-08-10 PROCEDURE — 97112 NEUROMUSCULAR REEDUCATION: CPT | Performed by: PHYSICAL THERAPIST

## 2020-08-10 PROCEDURE — 97530 THERAPEUTIC ACTIVITIES: CPT | Performed by: PHYSICAL THERAPIST

## 2020-08-10 NOTE — PROGRESS NOTES
PT DAILY TREATMENT NOTE - [facility] 2-15    Patient Name: Janey Morse  Date:8/10/2020  : 1980  [x]  Patient  Verified  Payor: MEDICAL [facility] / Plan: [facility] MEDICAL [place] [address] / Product Type: Commerical /    In time: 2:30pm  Out time: 3:20 pm  Total Treatment Time (min): 50  Total Timed Codes (min): 45  Visit #: 12       Treatment Area: Left leg pain [M79.605]    SUBJECTIVE  Pain Level (0-10 scale): 3/10  Any medication changes, allergies to medications, adverse drug reactions, diagnosis change, or new procedure performed?: [x] No    [] Yes (see summary sheet for update)  Subjective functional status/changes:   [] No changes reported  Pt states that she is the same and is having the same heel pain. She did stop doing her HARSH exercises the last few days because she woke with L hip pain. She is frustrated with her current functional level and the ability that she has to exercise at this time. OBJECTIVE    30 min Neuromuscular Re-education:  HARSH intervention see flow sheet. Rationale: increase ROM, increase strength and improve coordination to improve the patients ability to perform daily activities without increase in pain. 15 min Therapeutic Activities:  HARSH intervention and POC. Discussion of home exercising and progression through this. Rationale: increase ROM, increase strength and improve coordination to improve the patients ability to perform daily activities without increase in pain.        With   [] TE   [x] TA   [x] neuro   [] other: Patient Education: [x] Review HEP    [] Progressed/Changed HEP based on:   [] positioning   [] body mechanics   [] transfers   [] heat/ice application    [x] other: HARSH exercises and HARSH treatment approach      Other Objective/Functional Measures:   -  HGIR bilaterally  + L horizontal abd minimally - pt does not sense restriction  + PADT bilaterally however less than at last visit  - HADDT bilaterally  Apical expansion min limited bilaterally - improved over 2 visits ago   Hip IR bilaterally equal and WFL     Pain Level (0-10 scale) post treatment: 2/10    ASSESSMENT/Changes in Function:     Patient will continue to benefit from skilled PT services to modify and progress therapeutic interventions, address functional mobility deficits, address ROM deficits, address strength deficits and analyze and address soft tissue restrictions to attain remaining goals. []  See Plan of Care  []  See progress note/recertification  []  See Discharge Summary         Progress towards goals / Updated goals:   1) Patient will demonstrate Negative Hruska Adduction Drop Test  met  2) Patient will demonstrate independence with HEP progressing toward  3) Patient will demonstrate greater than Grade II on Hruska Adduction Lift Test met    PLAN  [x]  Upgrade activities as tolerated     [x]  Continue plan of care  [x]  Update interventions per flow sheet       []  Discharge due to:_  [x]  Other:_ cont with HARSH intervention; pt will cont with HEP for 2.5 weeks and then will re-assess as she is increasing her exercising at home.        Dori Worthy, PT, DPT, [facility] 8/10/2020

## 2020-08-17 ENCOUNTER — APPOINTMENT (OUTPATIENT)
Dept: PHYSICAL THERAPY | Age: 40
End: 2020-08-17
Payer: COMMERCIAL

## 2020-08-24 ENCOUNTER — APPOINTMENT (OUTPATIENT)
Dept: PHYSICAL THERAPY | Age: 40
End: 2020-08-24
Payer: COMMERCIAL

## 2020-08-28 ENCOUNTER — HOSPITAL ENCOUNTER (OUTPATIENT)
Dept: PHYSICAL THERAPY | Age: 40
Discharge: HOME | End: 2020-08-28
Payer: COMMERCIAL

## 2020-08-28 LAB
CHOLEST SERPL-MCNC: 149 MG/DL
GLUCOSE SERPL-MCNC: 77 MG/DL (ref 65–100)
HDLC SERPL-MCNC: 68 MG/DL
LDLC SERPL CALC-MCNC: 61.6 MG/DL (ref 0–100)
TRIGL SERPL-MCNC: 97 MG/DL (ref ?–150)

## 2020-08-28 PROCEDURE — 97112 NEUROMUSCULAR REEDUCATION: CPT | Performed by: PHYSICAL THERAPIST

## 2020-08-28 NOTE — PROGRESS NOTES
PT DAILY TREATMENT NOTE - [facility] 2-15    Patient Name: Lindsay Hutchins  Date:2020  : 1980  [x]  Patient  Verified  Payor: MEDICAL [facility] / Plan: [facility] MEDICAL [place] [address] / Product Type: Commerical /    In time: 9:15 am  Out time: 10:00 am  Total Treatment Time (min): 45  Total Timed Codes (min): 45  Visit #: 13       Treatment Area: Left leg pain [M79.605]    SUBJECTIVE  Pain Level (0-10 scale): 0-7/10  Any medication changes, allergies to medications, adverse drug reactions, diagnosis change, or new procedure performed?: [x] No    [] Yes (see summary sheet for update)  Subjective functional status/changes:   [] No changes reported  Pt states that she is doing much better than she was before and feels like she is at 80% overall. She is walking for about 1 mile now. She has not run because she is scared to try. 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 She is going to be getting some new shoes and will wear these when at work. She would like to be D/C if she can    OBJECTIVE    45 min Neuromuscular Re-education:  HARSH intervention see flow sheet. Rationale: increase ROM, increase strength and improve coordination to improve the patients ability to perform daily activities without increase in pain. With   [] TE   [] TA   [x] neuro   [] other: Patient Education: [x] Review HEP    [] Progressed/Changed HEP based on:   [] positioning   [] body mechanics   [] transfers   [] heat/ice application    [x] other: HARSH exercises and HARSH treatment approach      Other Objective/Functional Measures:   -  HGIR bilaterally  + PADT bilaterally however less than at last visit  + HADDT L  Apical expansion min limited bilaterally   Pt is unable to shift into L AFIR unsupported.     Pain Level (0-10 scale) post treatment: 2/10    ASSESSMENT/Changes in Function:     Patient will continue to benefit from skilled PT services to modify and progress therapeutic interventions, address functional mobility deficits, address ROM deficits, address strength deficits and analyze and address soft tissue restrictions to attain remaining goals.      []  See Plan of Care  []  See progress note/recertification  []  See Discharge Summary         Progress towards goals / Updated goals:   1) Patient will demonstrate Negative Hruska Adduction Drop Test  met  2) Patient will demonstrate independence with HEP progressing toward  3) Patient will demonstrate greater than Grade II on Hruska Adduction Lift Test met    PLAN  [x]  Upgrade activities as tolerated     [x]  Continue plan of care  [x]  Update interventions per flow sheet       []  Discharge due to:_  [x]  Other:_ cont with HARSH intervention, pt will f/u in 3 wk        Dori Worthy PT, DPT, [facility] 8/28/2020

## 2020-08-31 ENCOUNTER — APPOINTMENT (OUTPATIENT)
Dept: PHYSICAL THERAPY | Age: 40
End: 2020-08-31
Payer: COMMERCIAL

## 2020-09-21 ENCOUNTER — HOSPITAL ENCOUNTER (OUTPATIENT)
Dept: PHYSICAL THERAPY | Age: 40
Discharge: HOME | End: 2020-09-21
Payer: COMMERCIAL

## 2020-09-21 PROCEDURE — 97112 NEUROMUSCULAR REEDUCATION: CPT | Performed by: PHYSICAL THERAPIST

## 2020-09-21 NOTE — PROGRESS NOTES
PT DAILY TREATMENT NOTE - [facility] 2-15    Patient Name: Janey Morse  Date:2020  : 1980  [x]  Patient  Verified  Payor: MEDICAL [facility] / Plan: [facility] MEDICAL [place] [address] / Product Type: Commerical /    In time: 3:20 pm  Out time: 4:05 pm  Total Treatment Time (min): 45  Total Timed Codes (min): 45  Visit #: 14       Treatment Area: Left leg pain [M79.605]    SUBJECTIVE  Pain Level (0-10 scale): 0/10; at worst 4/10  Any medication changes, allergies to medications, adverse drug reactions, diagnosis change, or new procedure performed?: [x] No    [] Yes (see summary sheet for update)  Subjective functional status/changes:   [] No changes reported  Pt states that she is on her feet about 4 hours per day and is feeling some aching in the L calf and L posterior lateral knee. She reports that she was taking meloxicam for about 1 month with significant improvement. Has run out of this prescription and has not taken for last 3 days with pain increase. She is riding her bike up to 5 miles. Walking every other day for 2 miles per time. Has not tried running. She reports apprehension with running. She is going to try [employer] via [employer]. Pt reports that she is 90% of her premorbid level. OBJECTIVE    45 min Neuromuscular Re-education:  HARSH intervention see flow sheet. Rationale: increase ROM, increase strength and improve coordination to improve the patients ability to perform daily activities without increase in pain.         With   [] TE   [] TA   [x] neuro   [] other: Patient Education: [x] Review HEP    [] Progressed/Changed HEP based on:   [] positioning   [] body mechanics   [] transfers   [] heat/ice application    [x] other: HARSH exercises and HARSH treatment approach      Other Objective/Functional Measures:   -  HGIR bilaterally  + PADT bilaterally   + HADDT L  Apical expansion min limited bilaterally   Cont to require significant v.c. for exercise reproduction    Pain Level (0-10 scale) post treatment: 2/10    ASSESSMENT/Changes in Function:   HARSH objective testing is not fully clearing with intervention at this time. This is for unknown reasoning at this time. Patient will continue to benefit from skilled PT services to modify and progress therapeutic interventions, address functional mobility deficits, address ROM deficits, address strength deficits and analyze and address soft tissue restrictions to attain remaining goals.      []  See Plan of Care  []  See progress note/recertification  []  See Discharge Summary          Progress towards goals / Updated goals:   1) Patient will demonstrate Negative Hruska Adduction Drop Test  met  2) Patient will demonstrate independence with HEP progressing toward  3) Patient will demonstrate greater than Grade II on Hruska Adduction Lift Test met    PLAN  [x]  Upgrade activities as tolerated     [x]  Continue plan of care  []  Update interventions per flow sheet       []  Discharge due to:_   []  Other:_         Hadley Gonzalez, PT, DPT, [facility] 9/21/2020

## 2020-10-13 NOTE — ANCILLARY DISCHARGE INSTRUCTIONS
[facility] [address], [address] Laurence Linares Phone: [phone]  Fax: [phone] Discharge Summary Patient name: Theodora Lester  : 1980  Provider#: [id number] Referral source: Flip Reveles MD     
Medical/Treatment Diagnosis: Left leg pain [M79.605] Prior Hospitalization: see medical history Comorbidities: depression Prior Level of Function: running, care of 2 children, home care, N ADL and N IADL skills Medications: Verified on Patient Summary List 
 
Start of Care: 2020      Onset Date: 2020 Visits from Start of care: 14 
   
Assessment/ justin information: Hadley Beckman has emerged from her acute plantaris injury and has resumed ADL and IADL activities at premorbid level. She subjectively reports that she is doing well and wishes to continue with HEP and will return PRN. Short Term Goals: To be accomplished in 8 treatments: 1. Pt will demo independence with HEP with no v.c. met 2. Pt will demo standing tolerance to 15 min with 2/10 pn and no boot donned in clinic setting only met 3. Pt will demo ambulation x 250 ft with min antalgia and no LOB with boot donned. met 4. Pt will demo single limb stance on L without boot in clinical setting x 30 seconds with no LOB. met Long Term Goals: To be accomplished in 16 treatments: 1. Pt will demo independence with HEP at the time of D/C to allow for continued strength and endurance progression met 2. Pt will demo low fall risk on appropriate balance scale at the time of D/C met 3. Pt will demo amb x 1,000 ft with no pain, no boot and no LOB at the time of D/C pt has min pn, however distance met 4. Pt will demo all ADL/IADL/work activities without compensation or assistance needed partially met Keely Melara, PT, DPT, [facility] 10/13/2020

## 2020-10-14 ENCOUNTER — APPOINTMENT (OUTPATIENT)
Dept: PHYSICAL THERAPY | Age: 40
End: 2020-10-14

## 2021-01-03 ENCOUNTER — HOSPITAL ENCOUNTER (EMERGENCY)
Age: 41
Discharge: HOME | End: 2021-01-03
Attending: EMERGENCY MEDICINE
Payer: COMMERCIAL

## 2021-01-03 ENCOUNTER — APPOINTMENT (OUTPATIENT)
Dept: GENERAL RADIOLOGY | Age: 41
End: 2021-01-03
Attending: EMERGENCY MEDICINE
Payer: COMMERCIAL

## 2021-01-03 ENCOUNTER — NURSE TRIAGE (OUTPATIENT)
Dept: OTHER | Facility: CLINIC | Age: 41
End: 2021-01-03

## 2021-01-03 VITALS
WEIGHT: 121.69 LBS | BODY MASS INDEX: 22.39 KG/M2 | RESPIRATION RATE: 23 BRPM | SYSTOLIC BLOOD PRESSURE: 119 MMHG | HEIGHT: 62 IN | TEMPERATURE: 98.3 F | DIASTOLIC BLOOD PRESSURE: 77 MMHG | HEART RATE: 81 BPM | OXYGEN SATURATION: 99 %

## 2021-01-03 DIAGNOSIS — R05.9 COUGH: ICD-10-CM

## 2021-01-03 DIAGNOSIS — R07.89 CHEST WALL PAIN: Primary | ICD-10-CM

## 2021-01-03 DIAGNOSIS — E87.6 HYPOKALEMIA: ICD-10-CM

## 2021-01-03 LAB
ANION GAP SERPL CALC-SCNC: 11 MMOL/L (ref 5–15)
ATRIAL RATE: 87 BPM
BASOPHILS # BLD: 0 K/UL (ref 0–0.1)
BASOPHILS NFR BLD: 0 % (ref 0–1)
BUN SERPL-MCNC: 14 MG/DL (ref 6–20)
BUN/CREAT SERPL: 16 (ref 12–20)
CALCIUM SERPL-MCNC: 9.3 MG/DL (ref 8.5–10.1)
CALCULATED P AXIS, ECG09: 51 DEGREES
CALCULATED R AXIS, ECG10: 7 DEGREES
CALCULATED T AXIS, ECG11: 62 DEGREES
CHLORIDE SERPL-SCNC: 102 MMOL/L (ref 97–108)
CO2 SERPL-SCNC: 25 MMOL/L (ref 21–32)
CREAT SERPL-MCNC: 0.86 MG/DL (ref 0.55–1.02)
D DIMER PPP FEU-MCNC: 0.4 MG/L FEU (ref 0–0.65)
DIAGNOSIS, 93000: NORMAL
DIFFERENTIAL METHOD BLD: ABNORMAL
EOSINOPHIL # BLD: 0.2 K/UL (ref 0–0.4)
EOSINOPHIL NFR BLD: 1 % (ref 0–7)
ERYTHROCYTE [DISTWIDTH] IN BLOOD BY AUTOMATED COUNT: 11.7 % (ref 11.5–14.5)
GLUCOSE SERPL-MCNC: 101 MG/DL (ref 65–100)
HCG UR QL: NEGATIVE
HCT VFR BLD AUTO: 41.4 % (ref 35–47)
HGB BLD-MCNC: 13.6 G/DL (ref 11.5–16)
IMM GRANULOCYTES # BLD AUTO: 0 K/UL
IMM GRANULOCYTES NFR BLD AUTO: 0 %
LYMPHOCYTES # BLD: 6.4 K/UL (ref 0.8–3.5)
LYMPHOCYTES NFR BLD: 36 % (ref 12–49)
MCH RBC QN AUTO: 30.1 PG (ref 26–34)
MCHC RBC AUTO-ENTMCNC: 32.9 G/DL (ref 30–36.5)
MCV RBC AUTO: 91.6 FL (ref 80–99)
MONOCYTES # BLD: 1.1 K/UL (ref 0–1)
MONOCYTES NFR BLD: 6 % (ref 5–13)
NEUTS SEG # BLD: 10 K/UL (ref 1.8–8)
NEUTS SEG NFR BLD: 57 % (ref 32–75)
NRBC # BLD: 0 K/UL (ref 0–0.01)
NRBC BLD-RTO: 0 PER 100 WBC
P-R INTERVAL, ECG05: 152 MS
PLATELET # BLD AUTO: 258 K/UL (ref 150–400)
PMV BLD AUTO: 9.6 FL (ref 8.9–12.9)
POTASSIUM SERPL-SCNC: 3 MMOL/L (ref 3.5–5.1)
Q-T INTERVAL, ECG07: 342 MS
QRS DURATION, ECG06: 100 MS
QTC CALCULATION (BEZET), ECG08: 411 MS
RBC # BLD AUTO: 4.52 M/UL (ref 3.8–5.2)
RBC MORPH BLD: ABNORMAL
SODIUM SERPL-SCNC: 138 MMOL/L (ref 136–145)
TROPONIN I SERPL-MCNC: <0.05 NG/ML
VENTRICULAR RATE, ECG03: 87 BPM
WBC # BLD AUTO: 17.7 K/UL (ref 3.6–11)
WBC MORPH BLD: ABNORMAL

## 2021-01-03 PROCEDURE — 93005 ELECTROCARDIOGRAM TRACING: CPT

## 2021-01-03 PROCEDURE — 80048 BASIC METABOLIC PNL TOTAL CA: CPT

## 2021-01-03 PROCEDURE — 74011250637 HC RX REV CODE- 250/637: Performed by: EMERGENCY MEDICINE

## 2021-01-03 PROCEDURE — 71046 X-RAY EXAM CHEST 2 VIEWS: CPT

## 2021-01-03 PROCEDURE — 85379 FIBRIN DEGRADATION QUANT: CPT

## 2021-01-03 PROCEDURE — 96374 THER/PROPH/DIAG INJ IV PUSH: CPT

## 2021-01-03 PROCEDURE — 36415 COLL VENOUS BLD VENIPUNCTURE: CPT

## 2021-01-03 PROCEDURE — 81025 URINE PREGNANCY TEST: CPT

## 2021-01-03 PROCEDURE — 74011250636 HC RX REV CODE- 250/636: Performed by: EMERGENCY MEDICINE

## 2021-01-03 PROCEDURE — 99285 EMERGENCY DEPT VISIT HI MDM: CPT

## 2021-01-03 PROCEDURE — 84484 ASSAY OF TROPONIN QUANT: CPT

## 2021-01-03 PROCEDURE — 85025 COMPLETE CBC W/AUTO DIFF WBC: CPT

## 2021-01-03 RX ORDER — KETOROLAC TROMETHAMINE 30 MG/ML
30 INJECTION, SOLUTION INTRAMUSCULAR; INTRAVENOUS
Status: COMPLETED | OUTPATIENT
Start: 2021-01-03 | End: 2021-01-03

## 2021-01-03 RX ORDER — ALBUTEROL SULFATE 90 UG/1
2 AEROSOL, METERED RESPIRATORY (INHALATION)
COMMUNITY

## 2021-01-03 RX ORDER — CEFDINIR 300 MG/1
300 CAPSULE ORAL 2 TIMES DAILY
COMMUNITY

## 2021-01-03 RX ORDER — BENZONATATE 100 MG/1
200 CAPSULE ORAL
Qty: 30 CAP | Refills: 0 | Status: SHIPPED | OUTPATIENT
Start: 2021-01-03 | End: 2021-01-10

## 2021-01-03 RX ADMIN — POTASSIUM BICARBONATE 40 MEQ: 782 TABLET, EFFERVESCENT ORAL at 14:41

## 2021-01-03 RX ADMIN — KETOROLAC TROMETHAMINE 30 MG: 30 INJECTION, SOLUTION INTRAMUSCULAR at 14:42

## 2021-01-03 NOTE — ED PROVIDER NOTES
Date of Service:  1/3/2021    Patient:  Ronni Wallace    Chief Complaint:  Cough and Chest Pain       HPI:  Ronni Wallace is a 45 y.o.  female who presents for evaluation of chest pain and cough. Patient states around Thanksgiving she had some febrile illness where she had body aches cough and shortness of breath. She was tested negative for Covid both with PCR and rapid testing. 10 days ago she was seen for some continued cough and right-sided chest pain and was placed on antibiotics and albuterol and told to return if symptoms continued. Today, patient continues to have cough and some right-sided chest discomfort. Minimal.  She has exertional dyspnea but no fevers or body aches. She denies any other Covid type symptoms. She denies abdominal pain nausea vomiting diarrhea headaches fevers or other acute complaints. Pain is 5/10           Past Medical History:   Diagnosis Date    IUD (intrauterine device) in place 16 Mirena removed 2018    Pap smear for cervical cancer screening 10/12/16 neg HPV NEG    Psychiatric disorder        Past Surgical History:   Procedure Laterality Date    HX BARTHOLIN CYST MARSUPIALIZATION      HX  SECTION      x2         Family History:   Problem Relation Age of Onset    No Known Problems Mother        Social History     Socioeconomic History    Marital status:      Spouse name: Not on file    Number of children: Not on file    Years of education: Not on file    Highest education level: Not on file   Occupational History    Not on file   Social Needs    Financial resource strain: Not on file    Food insecurity     Worry: Not on file     Inability: Not on file    Transportation needs     Medical: Not on file     Non-medical: Not on file   Tobacco Use    Smoking status: Former Smoker    Smokeless tobacco: Never Used   Substance and Sexual Activity    Alcohol use:  Yes     Alcohol/week: 2.0 standard drinks     Types: 2 Glasses of wine per week    Drug use: No    Sexual activity: Yes     Partners: Male     Birth control/protection: None   Lifestyle    Physical activity     Days per week: Not on file     Minutes per session: Not on file    Stress: Not on file   Relationships    Social connections     Talks on phone: Not on file     Gets together: Not on file     Attends Alevism service: Not on file     Active member of club or organization: Not on file     Attends meetings of clubs or organizations: Not on file     Relationship status: Not on file    Intimate partner violence     Fear of current or ex partner: Not on file     Emotionally abused: Not on file     Physically abused: Not on file     Forced sexual activity: Not on file   Other Topics Concern    Not on file   Social History Narrative    Not on file         ALLERGIES: Patient has no known allergies. Review of Systems   Constitutional: Negative for fever. HENT: Negative for hearing loss. Eyes: Negative for visual disturbance. Respiratory: Positive for cough. Negative for shortness of breath. Cardiovascular: Positive for chest pain. Gastrointestinal: Negative for abdominal pain. Genitourinary: Negative for flank pain. Musculoskeletal: Negative for back pain. Skin: Negative for rash. Neurological: Negative for dizziness and light-headedness. Psychiatric/Behavioral: Positive for confusion. Vitals:    01/03/21 1225   BP: 139/79   Pulse: 93   Resp: 16   Temp: 98.3 °F (36.8 °C)   SpO2: 98%   Weight: 55.2 kg (121 lb 11.1 oz)   Height: 5' 2\" (1.575 m)            Physical Exam  Vitals signs and nursing note reviewed. Constitutional:       General: She is not in acute distress. Appearance: She is well-developed. HENT:      Head: Normocephalic and atraumatic. Eyes:      General: No scleral icterus. Conjunctiva/sclera: Conjunctivae normal.      Pupils: Pupils are equal, round, and reactive to light. Neck:      Musculoskeletal: Neck supple. Vascular: No JVD. Cardiovascular:      Rate and Rhythm: Normal rate and regular rhythm. Heart sounds: Murmur present. Systolic murmur present with a grade of 2/6. No gallop. Pulmonary:      Effort: Pulmonary effort is normal. No respiratory distress. Breath sounds: Normal breath sounds. No decreased breath sounds, wheezing, rhonchi or rales. Chest:      Chest wall: No mass. Abdominal:      General: There is no distension. Palpations: Abdomen is soft. Tenderness: There is no abdominal tenderness. Musculoskeletal: Normal range of motion. General: No tenderness or deformity. Right lower leg: No edema. Left lower leg: No edema. Skin:     General: Skin is warm and dry. Capillary Refill: Capillary refill takes less than 2 seconds. Neurological:      Mental Status: She is alert and oriented to person, place, and time. Psychiatric:         Behavior: Behavior normal.         Thought Content:  Thought content normal.         Judgment: Judgment normal.          [facility]  ED Course as of Jan 03 1656   Sun Jan 03, 2021   1237 EKG 1227  NSR 87  Normal axis and intervals  No acute ischemic changes    [GG]   1320 D-dimer: 0.40 [GG]      ED Course User Index  [GG] Sally Motta DO     VITAL SIGNS:  Patient Vitals for the past 4 hrs:   Pulse Resp BP SpO2   01/03/21 1430 81 23 119/77 99 %   01/03/21 1400 79 21 117/69 100 %   01/03/21 1330 82 26 118/74 100 %         LABS:  Recent Results (from the past 6 hour(s))   EKG, 12 LEAD, INITIAL    Collection Time: 01/03/21 12:27 PM   Result Value Ref Range    Ventricular Rate 87 BPM    Atrial Rate 87 BPM    P-R Interval 152 ms    QRS Duration 100 ms    Q-T Interval 342 ms    QTC Calculation (Bezet) 411 ms    Calculated P Axis 51 degrees    Calculated R Axis 7 degrees    Calculated T Axis 62 degrees    Diagnosis       Normal sinus rhythm  Incomplete right bundle branch block  Otherwise normal ECG  No previous ECGs available  Confirmed by Micaela Rasmussen MD, [address] ([id number]) on 1/3/2021 4:52:30 PM     CBC WITH AUTOMATED DIFF    Collection Time: 01/03/21 12:37 PM   Result Value Ref Range    WBC 17.7 (H) 3.6 - 11.0 K/uL    RBC 4.52 3.80 - 5.20 M/uL    HGB 13.6 11.5 - 16.0 g/dL    HCT 41.4 35.0 - 47.0 %    MCV 91.6 80.0 - 99.0 FL    MCH 30.1 26.0 - 34.0 PG    MCHC 32.9 30.0 - 36.5 g/dL    RDW 11.7 11.5 - 14.5 %    PLATELET 742 322 - 793 K/uL    MPV 9.6 8.9 - 12.9 FL    NRBC 0.0 0.0  WBC    ABSOLUTE NRBC 0.00 0.00 - 0.01 K/uL    NEUTROPHILS 57 32 - 75 %    LYMPHOCYTES 36 12 - 49 %    MONOCYTES 6 5 - 13 %    EOSINOPHILS 1 0 - 7 %    BASOPHILS 0 0 - 1 %    IMMATURE GRANULOCYTES 0 %    ABS. NEUTROPHILS 10.0 (H) 1.8 - 8.0 K/UL    ABS. LYMPHOCYTES 6.4 (H) 0.8 - 3.5 K/UL    ABS. MONOCYTES 1.1 (H) 0.0 - 1.0 K/UL    ABS. EOSINOPHILS 0.2 0.0 - 0.4 K/UL    ABS. BASOPHILS 0.0 0.0 - 0.1 K/UL    ABS. IMM. GRANS. 0.0 K/UL    DF MANUAL      RBC COMMENTS NORMOCYTIC, NORMOCHROMIC      WBC COMMENTS REACTIVE LYMPHS     METABOLIC PANEL, BASIC    Collection Time: 01/03/21 12:37 PM   Result Value Ref Range    Sodium 138 136 - 145 mmol/L    Potassium 3.0 (L) 3.5 - 5.1 mmol/L    Chloride 102 97 - 108 mmol/L    CO2 25 21 - 32 mmol/L    Anion gap 11 5 - 15 mmol/L    Glucose 101 (H) 65 - 100 mg/dL    BUN 14 6 - 20 MG/DL    Creatinine 0.86 0.55 - 1.02 MG/DL    BUN/Creatinine ratio 16 12 - 20      GFR est AA >60 >60 ml/min/1.73m2    GFR est non-AA >60 >60 ml/min/1.73m2    Calcium 9.3 8.5 - 10.1 MG/DL   TROPONIN I    Collection Time: 01/03/21 12:37 PM   Result Value Ref Range    Troponin-I, Qt. <0.05 <0.05 ng/mL   D DIMER    Collection Time: 01/03/21  1:00 PM   Result Value Ref Range    D-dimer 0.40 0.00 - 0.65 mg/L FEU   HCG URINE, QL. - POC    Collection Time: 01/03/21  1:01 PM   Result Value Ref Range    Pregnancy test,urine (POC) Negative NEG          IMAGING:  XR CHEST PA LAT   Final Result   IMPRESSION:       No acute process.                   Medications During Visit:  Medications   ketorolac (TORADOL) injection 30 mg (30 mg IntraVENous Given 1/3/21 1442)   potassium bicarb-citric acid (EFFER-K) tablet 40 mEq (40 mEq Oral Given 1/3/21 1441)         DECISION MAKING:  Ronni Wallace is a 45 y.o. female who comes in as above. Here, patient appears well. Medicine as below. Diagnostics as above. Cause of the patient's pain is unknown of her here she shows no signs of pneumonia or lung pathology. White count slightly elevated but this could be from coughing for a month. Patient to follow with her primary care and return as needed. All questions answered. IMPRESSION:  1. Chest wall pain    2. Cough    3. Hypokalemia        DISPOSITION:  Discharged      Discharge Medication List as of 1/3/2021  2:20 PM      START taking these medications    Details   benzonatate (Tessalon Perles) 100 mg capsule Take 2 Caps by mouth three (3) times daily as needed for Cough for up to 7 days. , Normal, Disp-30 Cap, R-0         CONTINUE these medications which have NOT CHANGED    Details   cefdinir (OMNICEF) 300 mg capsule Take 300 mg by mouth two (2) times a day., Historical Med      albuterol (PROVENTIL HFA, VENTOLIN HFA, PROAIR HFA) 90 mcg/actuation inhaler Take 2 Puffs by inhalation. , Historical Med      buPROPion SR (WELLBUTRIN SR) 150 mg SR tablet Take 200 mg by mouth two (2) times a day., Historical Med              Follow-up Information     Follow up With Specialties Details Why Contact Info    [address]  [address] [place] [address]  [phone]              The patient is asked to follow-up with their primary care provider in the next several days. They are to call tomorrow for an appointment. The patient is asked to return promptly for any increased concerns or worsening of symptoms. They can return to this emergency department or any other emergency department.     Procedures

## 2021-01-03 NOTE — ED TRIAGE NOTES
Pt presents to Ed with c/o right upper chest pain over the past 10 days. The pain is worse with movememnt and coughing. The pt was seen at Urgent Care on 12/29/2020 and was started on Cefdinir, albuterol and Prednisone. Pt states her pain has persisted.

## 2021-01-03 NOTE — TELEPHONE ENCOUNTER
CALL RECEIVED FROM: Wife of employee called stating needs to have an ED visit. PATIENT STATES: Thanksgiving got sick, covid test neg, started feeling better except cough just got worse. Last week had tightness in chest pain and shortness of breath. Was given antibiotic, steroids, and inhaler. Finished steroids yesterday and is feeling some better but the pain has moved to under the right arm and breast. Pain is heavy in center of chest at times. PROTOCOL RECOMMENDATION: Call 911/Go to ED, care advice given and patient stated she is going to have  drive her because she is only a mile down the road. Informed her our advice is to go via ambulance but if she insists on having someone take her she should call 911 if necessary. TRANSFER TO:   PLEASE DO NOT RESPOND TO THIS TRIAGE NOTE THROUGH THIS ENCOUNTER. ANY SUBSEQUENT COMMUNICATION SHOULD BE DIRECTLY WITH THE PATIENT. Reason for Disposition   [1] Chest pain lasts > 5 minutes AND [2] described as crushing, pressure-like, or heavy    Answer Assessment - Initial Assessment Questions  1. LOCATION: \"Where does it hurt? \"        Right side of chest and under right breast. It was in back but it is in the middle of her chest as well now when she coughs. 2. RADIATION: \"Does the pain go anywhere else? \" (e.g., into neck, jaw, arms, back)      no  3. ONSET: \"When did the chest pain begin? \" (Minutes, hours or days)       Approximately December 22.   4. PATTERN \"Does the pain come and go, or has it been constant since it started? \"  \"Does it get worse with exertion? \"       Constant and worse with exertion. 5. DURATION: \"How long does it last\" (e.g., seconds, minutes, hours)      constant  6. SEVERITY: \"How bad is the pain? \"  (e.g., Scale 1-10; mild, moderate, or severe)     - MILD (1-3): doesn't interfere with normal activities      - MODERATE (4-7): interferes with normal activities or awakens from sleep     - SEVERE (8-10): excruciating pain, unable to do any normal activities        Moderate. 7. CARDIAC RISK FACTORS: \"Do you have any history of heart problems or risk factors for heart disease? \" (e.g., angina, prior heart attack; diabetes, high blood pressure, high cholesterol, smoker, or strong family history of heart disease)      None, one side of family has heart history,   8. PULMONARY RISK FACTORS: \"Do you have any history of lung disease? \"  (e.g., blood clots in lung, asthma, emphysema, birth control pills)      no  9. CAUSE: \"What do you think is causing the chest pain? \"      She doesn't know. 10. OTHER SYMPTOMS: \"Do you have any other symptoms? \" (e.g., dizziness, nausea, vomiting, sweating, fever, difficulty breathing, cough)        Sweats especially at night, difficulty breathing. 11. PREGNANCY: \"Is there any chance you are pregnant? \" \"When was your last menstrual period? \"        Probably not. Last period was 3 weeks ago.     Protocols used: CHEST PAIN-ADULT-AH

## 2021-01-04 ENCOUNTER — PATIENT OUTREACH (OUTPATIENT)
Dept: OTHER | Age: 41
End: 2021-01-04

## 2021-01-04 NOTE — PROGRESS NOTES
HPRP progress note    Patient eligible for Gerry Chu 994 care management    Received notification of discharge from [facility] ED on 1/3/21 for Chest Wall Pain. Contacted patient to discuss post discharge needs and offer care management services. Two patient identifiers verified. Discussed the care management program.  Patient agrees to care management services at this time. PMH:   Past Medical History:   Diagnosis Date    IUD (intrauterine device) in place 11/9/16 Mirena removed 1/2018    Pap smear for cervical cancer screening 10/12/16 neg HPV NEG    Psychiatric disorder        Social History:   Social History     Socioeconomic History    Marital status:      Spouse name: Not on file    Number of children: Not on file    Years of education: Not on file    Highest education level: Not on file   Occupational History    Not on file   Social Needs    Financial resource strain: Not on file    Food insecurity     Worry: Not on file     Inability: Not on file    Transportation needs     Medical: Not on file     Non-medical: Not on file   Tobacco Use    Smoking status: Former Smoker    Smokeless tobacco: Never Used   Substance and Sexual Activity    Alcohol use:  Yes     Alcohol/week: 2.0 standard drinks     Types: 2 Glasses of wine per week    Drug use: No    Sexual activity: Yes     Partners: Male     Birth control/protection: None   Lifestyle    Physical activity     Days per week: Not on file     Minutes per session: Not on file    Stress: Not on file   Relationships    Social connections     Talks on phone: Not on file     Gets together: Not on file     Attends Christian service: Not on file     Active member of club or organization: Not on file     Attends meetings of clubs or organizations: Not on file     Relationship status: Not on file    Intimate partner violence     Fear of current or ex partner: Not on file     Emotionally abused: Not on file     Physically abused: Not on file     Forced sexual activity: Not on file   Other Topics Concern    Not on file   Social History Narrative    Not on file         Care management assessment completed:    *Spoke with patient who reports that she is feeling better today. Still c/o Chest Wall Pain  - rates as 4 on 0 - 10 pain scale. Patient states that she still has cough and is taking Delsym Cough syrup. Patient did not get prescription filled for Tessalon Perles. *Patient states that she was not tested for COVID 19 again - XRay looked good and Physician felt that symptoms did not     *Patient had not made follow up appointment with PCP - offered to make appointment and patient agreed. Appointment made for Tuesday, 1/12/21 @ 3:30p. Patient informed of appointment and patient repeated information back to this CC. Condition Focused Assessment:   Respiratory Condition Focused Assessment  Cough yes    Patient reported important numbers to know:1/3/21  Oxygen level 98%   Temperature 98.3 °F   Description of any sputum Clear   Pain 5/10   Weight 121 lb 11.1 oz     Any change in activity level? No  Any of the following? Shortness of breath or difficulty breathing no   Dizziness/lightheadedness no   Fever no   Low body temperature no   Chills or shaking no   Sweating no    Dyspnea on exertion no     Fatigue no     Anxiety no    Confusionno   Unexplained change in weight loss no   Sleep disturbance no         Red Flags:  Call 911 anytime you think you may need emergency care. For example, call if:   You have severe trouble breathing. Call your doctor now or seek immediate medical care if:  You cough up blood. You have new or worse trouble breathing. You have a new or higher fever. You have a new rash. You cough more deeply or more often, especially if you notice more mucus  or a change in the color of your mucus. You have new symptoms, such as a sore throat, an earache, or sinus pain.         Medications:  New Medications at Discharge:   benzonatate (Tessalon Perles) 100 mg capsule - Patient did not have filled    Changed Medications at Discharge: None Noted  Discontinued Medications at Discharge: None Noted  Current Outpatient Medications   Medication Sig    cefdinir (OMNICEF) 300 mg capsule Take 300 mg by mouth two (2) times a day.  albuterol (PROVENTIL HFA, VENTOLIN HFA, PROAIR HFA) 90 mcg/actuation inhaler Take 2 Puffs by inhalation.  benzonatate (Tessalon Perles) 100 mg capsule Take 2 Caps by mouth three (3) times daily as needed for Cough for up to 7 days.  buPROPion SR (WELLBUTRIN SR) 150 mg SR tablet Take 200 mg by mouth two (2) times a day. No current facility-administered medications for this visit. There are no discontinued medications. Performed medication reconciliation with patient, and patient verbalizes understanding of administration of home medications. There were no barriers to obtaining medications identified at this time. Preventative Care     Health Maintenance   Topic Date Due    DTaP/Tdap/Td series (1 - Tdap) 01/16/2002    PAP AKA CERVICAL CYTOLOGY  10/12/2019    Flu Vaccine (1) 09/01/2020    Pneumococcal 0-64 years  Aged Out         CM Identified  Problems  (Contributing problems for risk for readmission)   1. Pain  2. Potential Learning Need      Goals    Patients pain will be controlled at 5/10 or less with pain medication and/or non-pharmacologic methods. ? 1/3/21 -  Rates pain as 4 on 0-10 pain scale     Demonstrates no signs of Complications or Red Flags in 30 day  Reviewed and discussed importance of monitoring and reporting Red Flags  ? 1/3/21 - Denies any Red Flag Symptoms     Completes all Follow-Up appointments within 30 days   Educated on importance of Follow Up for prevention of complications  ? 1/3/21 - Assisted patient with making ED follow up appointment - Tuesday, 1/12/@ 3:30p.       Barriers/Support system:  patient and spouse      Barriers/Challenges to Care: [] Decline in memory    []  Language barrier     []  Emotional                  []  Limited mobility  []  Lack of motivation     [] Vision, hearing or cognitive impairment []  Knowledge [] Financial Barriers []  Lack of support  []  Pain []  Other [x]  None    PCP/Specialist follow up: Patient scheduled to follow up with Eddie Bob DO Tuesday 1/12/21 @ 3:30p. .      Reviewed red flags with patient, and patient verbalizes understanding. Patient given an opportunity to ask questions. No other clinical/social/functional needs noted. The patient agrees to contact the PCP office for questions related to their healthcare. The patient expressed thanks, offered no additional questions and ended the call.       Plan for next call: 1/19/21  F/u - PCP Appointment, Cough

## 2021-01-19 ENCOUNTER — PATIENT OUTREACH (OUTPATIENT)
Dept: OTHER | Age: 41
End: 2021-01-19

## 2021-01-19 NOTE — PROGRESS NOTES
HPRP f/u:  Telephone attempt to contact patient for Health Promotion and Risk Prevention. Left discreet message on voicemail with this CC contact information. Will follow for one month for transitions of care needs. Next outreach is 2/5/21 for discussion f/u -  ? PCP Appt and Resolve Episode.

## 2021-02-05 ENCOUNTER — PATIENT OUTREACH (OUTPATIENT)
Dept: OTHER | Age: 41
End: 2021-02-05

## 2021-02-05 NOTE — LETTER
2/5/2021 4:10 PM 
 
Ms. Ed Cruz [address] [facility] [id number] Dear Ed Cruz My name is Daniela Bedoya,  Associate Care Coordinator for [facility], and I have been trying to reach you. The Associate Care Management (ACM) program is a free-of-charge, confidential service provided to our employees and their family members covered by the [facility]. I can help you with care transitions such as when you come home from the hospital, when help is needed to manage your disease, or when you need assistance coordinating services or appointments. As healthcare providers, we know that patients do better when they have close follow up with a primary care provider (PCP). I can help you find one that is convenient to you and covered by your insurance. I can also help you understand any after visit instructions, such as what symptoms to watch out for, or any new or changed medications. We can work together using your preferred communication -- telephone, email, TalkPlushart. If you do not have a [employer] account, I can help you request access. Our program is designed to provide you with the opportunity to have a [facility] care manager partner with you for your healthcare needs. Due to not being able to reach you, I am closing out the current program, but will remain available to you should you have any questions. Please contact me at the below number if I can provide you with assistance for any of the above services. Sincerely, 
 
Leonardo Candelario LPN  [facility] Care Coordinator Associate Care Management [facility] [address] [phone]  F [phone]  [email or web address]

## 2021-02-05 NOTE — PROGRESS NOTES
Final call made today to attempt to contact patient. Discreet message left on voicemail with contact information. Resolving current episode for case management due to patient lost to follow up. . Patient has not been reached after repeated calls and letters. Letter sent to patient notifying completion of services due to unable to reach. This writer's contact information and information regarding program services included in materials sent. Goals updated to reflect current status as appropriate.  Will remain available should patient request re-initiation of case management or transitions of care services

## 2023-02-22 ENCOUNTER — HOSPITAL ENCOUNTER (OUTPATIENT)
Dept: PHYSICAL THERAPY | Age: 43
Discharge: HOME | End: 2023-02-22
Payer: COMMERCIAL

## 2023-02-22 PROCEDURE — 97110 THERAPEUTIC EXERCISES: CPT | Performed by: PHYSICAL THERAPIST

## 2023-02-22 PROCEDURE — 97161 PT EVAL LOW COMPLEX 20 MIN: CPT | Performed by: PHYSICAL THERAPIST

## 2023-02-22 PROCEDURE — 97016 VASOPNEUMATIC DEVICE THERAPY: CPT | Performed by: PHYSICAL THERAPIST

## 2023-02-22 PROCEDURE — 97535 SELF CARE MNGMENT TRAINING: CPT | Performed by: PHYSICAL THERAPIST

## 2023-02-22 NOTE — THERAPY EVALUATION
Physical Therapy at [facility],   a part of  [facility]  [address] [place] [address] Betzy Flores  Phone: [phone]  Fax: [phone]    Plan of Care/ Statement of Necessity for Physical Therapy Services 2-15    Patient name: Freddie Mays  : 1980  Provider#: [id number]  Referral source: Bethany Hodgson MD      Medical/Treatment Diagnosis: Other abnormalities of gait and mobility [R26.89]     Prior Hospitalization: see medical history     Comorbidities: None  Prior Level of Function: Pt enjoys hiking and works part time as a preK teacher  Medications: Verified on Patient Summary List    Start of Care: 23      Onset Date: >2 years ago       The Plan of Care and following information is based on the information from the initial evaluation. Assessment/ key information: The patient presents with L heel pain s/p L Tenex procedure 23. She has had L heel pain for the past 2 years and has had significant relief in symptoms at rest since having the Tenex procedure but continues to have pain in standing and with walking. The patient would benefit from outpatient PT 1x/week for up to 12 weeks to restore reciprocal gait mechanics and proper standing posture to reduce tension in her feet with weight bearing activity.     Evaluation Complexity History LOW Complexity : Zero comorbidities / personal factors that will impact the outcome / POC; Examination HIGH Complexity : 4+ Standardized tests and measures addressing body structure, function, activity limitation and / or participation in recreation  ;Presentation LOW Complexity : Stable, uncomplicated  ;Clinical Decision Making MEDIUM Complexity : FOTO score of 26-74  Overall Complexity Rating: LOW     Problem List: pain affecting function, decrease ROM, decrease strength, edema affecting function, impaired gait/ balance, decrease ADL/ functional abilitiies, decrease activity tolerance, decrease flexibility/ joint mobility, and decrease transfer abilities   Treatment Plan may include any combination of the following: Therapeutic exercise, Neuromuscular reeducation, Manual therapy, Therapeutic activity, Self care/home management, Electric stim unattended , and Vasopneumatic device  Patient / Family readiness to learn indicated by: asking questions, trying to perform skills, and interest  Persons(s) to be included in education: patient (P)  Barriers to Learning/Limitations: None  Patient Goal (s): \"no pain\"  Patient Self Reported Health Status: good  Rehabilitation Potential: excellent    Short Term Goals: To be accomplished in 4 weeks:  1) The patient will be independent with introductory HEP  2) The patient will demonstrate ability to hold SLS 30 seconds without an increase in pain to improve ease with getting dressed in the morning   3) The patient will ambulate 15 minutes without an increase in pain to improve ease with grocery shopping  Long Term Goals: To be accomplished in 12 weeks:  1) The patient will complete a full shift at work without an increase in pain  2) The patient will report ability to walk 30 minutes without an increase in pain to improve ability to care for her dogs  3) The patient will demonstrate ability to perform 20 SL HR without an increase in pain to improve ease with return to running and jumping  Frequency / Duration: Patient to be seen 1 times per week for 12 weeks. Patient/ Caregiver education and instruction: self care, activity modification, and exercises    [x]  Plan of care has been reviewed with ABHIJIT Bustamante, PT 2/22/2023     ________________________________________________________________________    I certify that the above Therapy Services are being furnished while the patient is under my care. I agree with the treatment plan and certify that this therapy is necessary.     [de-identified] Signature:____________________  Date:____________Time: _________      Jimbo Malone Emy Kern MD

## 2023-02-22 NOTE — PROGRESS NOTES
PT INITIAL EVALUATION NOTE - [facility] -15    Patient Name: Shannan Goff  Date:2023  : 1980  [x]  Patient  Verified  Payor: YING MAGANA / Plan: WESTLEYJAIR CASILLAS [facility] [address] / Product Type: PPO /    In time: 1:45 PM  Out time: 245 PM  Total Treatment Time (min): 55  Total Timed Codes (min): 25  1:1 Treatment Time ( [address] only): 25   Visit #: 1     Treatment Area: Other abnormalities of gait and mobility [R26.89]    SUBJECTIVE  Pain Level (0-10 scale): 4-5/10  At worst 8/10  At best 0/10  Any medication changes, allergies to medications, adverse drug reactions, diagnosis change, or new procedure performed?: [] No    [x] Yes (see summary sheet for update)  Subjective:    Pt is recovering from the Tenex procedure for her L plantar fasciitis  .  She was in a  boot for 2 weeks. Pain is increased by standing at work (3 hrs) and walking 1/4 mile and is decreased with rest and taking tylenol arthritis. PLOF: Pt enjoys walking her dog, hiking, working 15 hours per week as a   Mechanism of Injury: insidious onset  Previous Treatment/Compliance: None  PMHx/Surgical Hx: none  Work Hx: See above  Living Situation: Pt lives in a home with stairs, no trouble with stairs  Pt Goals: \"no pain\"  Barriers: chronic nature of condition  Motivation: good  Substance use: none  Cognition: A & O x 4        OBJECTIVE/EXAMINATION  Posture:  Low arches   Other Observations:  Asics Gel Nimbus donned  Gait and Functional Mobility:  Pt ambulates with decreased heel strike on the lateral border of her foot, hands in her pockets, looking down at the ground  Palpation: Tenderness to palpation at incision site  Swelling: none  Joint Mobility: Good    Lower Extremity AROM:        R  L        Ankle DF  18   10  Ankle PF  70   70 p!     LOWER QUARTER   MUSCLE STRENGTH  KEY       R  L  0 - No Contraction  L1, L2 Psoas  5  5    1 - Trace   L3 Quads  5  5    2 - Poor   L4 Tib Ant  5  5    3 - Fair    L5 EHL  5  4+ p!    4 - Good   S1 FHL  5  5    5 - Normal   S2 Hams  5  5            MMT: See above  Ankle INV 5/5 B  Ankle EVR 5/5 B  Neurological: Sensation WNL  Special Tests:    Single Leg Stance: R 30 s L 30 s p!   Toes resting up with several exercises       Modality rationale: decrease inflammation and decrease pain to improve the patients ability to sit, stand, transfer, ambulate, complete ADLs   Min Type Additional Details    [] Estim: []Att   []Unatt        []TENS instruct                  []IFC  []Premod   []NMES                     []Other:  []w/US   []w/ice   []w/heat  Position:  Location:    []  Traction: [] Cervical       []Lumbar                       [] Prone          []Supine                       []Intermittent   []Continuous Lbs:  [] before manual  [] after manual  []w/heat    []  Ultrasound: []Continuous   [] Pulsed at:                           []1MHz   []3MHz Location:  W/cm2:    [] Paraffin         Location:   []w/heat    []  Ice     []  Heat  []  Ice massage Position:  Location:    []  Laser  []  Other: Position:  Location:     15 [x]  Vasopneumatic Device Pressure:       [] lo [x] med [] hi   Temperature: 34     [x] Skin assessment post-treatment:  [x]intact []redness- no adverse reaction    []redness - adverse reaction:     10 min Therapeutic Exercise:  [x] See flow sheet :   Rationale: increase ROM, increase strength, and improve coordination to improve the patients ability to sit, stand, transfer, ambulate, lift, carry, reach, complete ADLs    15 min Self Care/Home Management:  [x]  See flow sheet : resting toes on ground and sensing arches in standing, walking with arm swing, eyes up, heel strike, scar mobs demo and assign for HEP   Rationale: improve coordination, improve balance, and increase proprioception  to improve the patients ability to sit, stand, transfer, ambulate, lift, carry, reach, complete ADLs        With   [x] TE   [] TA   [] Neuro   [x] SC   [] other: Patient Education: [x] Review HEP    [] Progressed/Changed HEP based on:   [x] positioning   [x] body mechanics   [] transfers   [x] heat/ice application    [] other:      Other Objective/Functional Measures: FOTO Functional Measure: 65/100              slight           Pain Level (0-10 scale) post treatment: 0    ASSESSMENT/Changes in Function:     [x]  See Plan of [address] [place] [address], PT 2/22/2023

## 2023-03-06 ENCOUNTER — HOSPITAL ENCOUNTER (OUTPATIENT)
Dept: PHYSICAL THERAPY | Age: 43
Discharge: HOME | End: 2023-03-06
Payer: COMMERCIAL

## 2023-03-06 PROCEDURE — 97110 THERAPEUTIC EXERCISES: CPT | Performed by: PHYSICAL THERAPIST

## 2023-03-06 PROCEDURE — 97016 VASOPNEUMATIC DEVICE THERAPY: CPT | Performed by: PHYSICAL THERAPIST

## 2023-03-06 NOTE — PROGRESS NOTES
PT DAILY TREATMENT NOTE - [facility] 2-15    Patient Name: Janeen Salinas  Date:3/6/2023  : 1980  [x]  Patient  Verified  Payor: Yong Millan / Plan: [facility] SONJA [facility] [address] / Product Type: PPO /    In time: 128 pm  Out time: 225 pm  Total Treatment Time (min): 57  Total Timed Codes (min): 42  1:1 Treatment Time ( [address] only): 37   Visit #:  2    Treatment Area: Other abnormalities of gait and mobility [R26.89]    SUBJECTIVE  Pain Level (0-10 scale): 5/10  Any medication changes, allergies to medications, adverse drug reactions, diagnosis change, or new procedure performed?: [x] No    [] Yes (see summary sheet for update)  Subjective functional status/changes:   [] No changes reported  Pt reports she just came from work so is in a little more pain than usual.     OBJECTIVE    Modality rationale: decrease inflammation and decrease pain to improve the patients ability to stand, ambulate, and perform work tasks with reduced pain.     Min Type Additional Details       [] Estim: []Att   []Unatt    []TENS instruct                  []IFC  []Premod   []NMES                     []Other:  []w/US   []w/ice   []w/heat  Position:  Location:       []  Traction: [] Cervical       []Lumbar                       [] Prone          []Supine                       []Intermittent   []Continuous Lbs:  [] before manual  [] after manual  []w/heat    []  Ultrasound: []Continuous   [] Pulsed                       at: []1MHz   []3MHz Location:  W/cm2:    [] Paraffin         Location:   []w/heat    []  Ice     []  Heat  []  Ice massage Position:  Location:    []  Laser  []  Other: Position:  Location:     15 [x]  Vasopneumatic Device Pressure:       [] lo [x] med [] hi   Temperature: 34     [x] Skin assessment post-treatment:  [x]intact []redness- no adverse reaction    []redness - adverse reaction:     37 min Therapeutic Exercise:  [x] See flow sheet :   Rationale: increase ROM, increase strength, and improve coordination to improve the patients ability to ambulate, hike, and perform work related tasks with reduced pain. With   [] TE   [] TA   [] Neuro   [] SC   [] other: Patient Education: [x] Review HEP    [] Progressed/Changed HEP based on:   [] positioning   [] body mechanics   [] transfers   [] heat/ice application    [] other:      Other Objective/Functional Measures: --     Pain Level (0-10 scale) post treatment: 3/10 \"sore\"    ASSESSMENT/Changes in Function:   Pt participated well with skilled PT services, but was limited with weight bearing exercises secondary to increased pain levels. Patient will continue to benefit from skilled PT services to modify and progress therapeutic interventions, address functional mobility deficits, address ROM deficits, address strength deficits, analyze and address soft tissue restrictions, analyze and cue movement patterns, analyze and modify body mechanics/ergonomics, and assess and modify postural abnormalities to attain remaining goals. []  See Plan of Care  []  See progress note/recertification  []  See Discharge Summary         Progress towards goals / Updated goals:  Short Term Goals: To be accomplished in 4 weeks:  1) The patient will be independent with introductory HEP  2) The patient will demonstrate ability to hold SLS 30 seconds without an increase in pain to improve ease with getting dressed in the morning   3) The patient will ambulate 15 minutes without an increase in pain to improve ease with grocery shopping  Long Term Goals:  To be accomplished in 12 weeks:  1) The patient will complete a full shift at work without an increase in pain  2) The patient will report ability to walk 30 minutes without an increase in pain to improve ability to care for her dogs  3) The patient will demonstrate ability to perform 20 SL HR without an increase in pain to improve ease with return to running and jumping    PLAN  [x]  Upgrade activities as tolerated     [x]  Continue plan of care  []  Update interventions per flow sheet       []  Discharge due to:_  []  Other:_      Dustin Huerta, PT 3/6/2023

## 2023-03-13 ENCOUNTER — HOSPITAL ENCOUNTER (OUTPATIENT)
Dept: PHYSICAL THERAPY | Age: 43
Discharge: HOME | End: 2023-03-13
Payer: COMMERCIAL

## 2023-03-13 PROCEDURE — 97016 VASOPNEUMATIC DEVICE THERAPY: CPT | Performed by: PHYSICAL THERAPIST

## 2023-03-13 PROCEDURE — 97110 THERAPEUTIC EXERCISES: CPT | Performed by: PHYSICAL THERAPIST

## 2023-03-13 PROCEDURE — 97035 APP MDLTY 1+ULTRASOUND EA 15: CPT | Performed by: PHYSICAL THERAPIST

## 2023-03-13 NOTE — PROGRESS NOTES
PT DAILY TREATMENT NOTE - [facility] 2-15    Patient Name: Lina Colon  Date:3/13/2023  : 1980  [x]  Patient  Verified  Payor: Fiorella Sagastume / Plan: [facility] SONJA [facility] [address] / Product Type: PPO /    In time: 130 pm  Out time: 227 pm  Total Treatment Time (min): 57  Total Timed Codes (min): 42  1:1 Treatment Time ( [address] only): 37  Visit #:  3    Treatment Area: Other abnormalities of gait and mobility [R26.89]    SUBJECTIVE  Pain Level (0-10 scale): 5/10  Any medication changes, allergies to medications, adverse drug reactions, diagnosis change, or new procedure performed?: [x] No    [] Yes (see summary sheet for update)  Subjective functional status/changes:   [] No changes reported  Pt reports she was on her feet a lot yesterday with increased pain today. She also worked this morning, but did ice afterwards. OBJECTIVE    Modality rationale: decrease inflammation and decrease pain to improve the patients ability to stand, ambulate, and perform work tasks with reduced pain.     Min Type Additional Details       [] Estim: []Att   []Unatt    []TENS instruct                  []IFC  []Premod   []NMES                     []Other:  []w/US   []w/ice   []w/heat  Position:  Location:       []  Traction: [] Cervical       []Lumbar                       [] Prone          []Supine                       []Intermittent   []Continuous Lbs:  [] before manual  [] after manual  []w/heat   8 [x]  Ultrasound: []Continuous   [x] Pulsed                       at: []1MHz   [x]3MHz Location: plantar fascia  W/cm2:1.0    [] Paraffin         Location:   []w/heat    []  Ice     []  Heat  []  Ice massage Position:  Location:    []  Laser  []  Other: Position:  Location:     15 [x]  Vasopneumatic Device Pressure:       [] lo [x] med [] hi   Temperature: 34     [x] Skin assessment post-treatment:  [x]intact []redness- no adverse reaction    []redness - adverse reaction:     34 min Therapeutic Exercise:  [x] See flow sheet : Rationale: increase ROM, increase strength, and improve coordination to improve the patients ability to ambulate, hike, and perform work related tasks with reduced pain. With   [] TE   [] TA   [] Neuro   [] SC   [] other: Patient Education: [x] Review HEP    [] Progressed/Changed HEP based on:   [] positioning   [] body mechanics   [] transfers   [] heat/ice application    [] other:      Other Objective/Functional Measures: required intermittent UE assist to maintain L SLS     Pain Level (0-10 scale) post treatment: 3/10 \"better\"    ASSESSMENT/Changes in Function:   Pt participated well with skilled PT services. Continued increased plantar fascial pain with weight bearing. Patient will continue to benefit from skilled PT services to modify and progress therapeutic interventions, address functional mobility deficits, address ROM deficits, address strength deficits, analyze and address soft tissue restrictions, analyze and cue movement patterns, analyze and modify body mechanics/ergonomics, and assess and modify postural abnormalities to attain remaining goals. []  See Plan of Care  []  See progress note/recertification  []  See Discharge Summary         Progress towards goals / Updated goals:  Short Term Goals: To be accomplished in 4 weeks:  1) The patient will be independent with introductory HEP  2) The patient will demonstrate ability to hold SLS 30 seconds without an increase in pain to improve ease with getting dressed in the morning   3) The patient will ambulate 15 minutes without an increase in pain to improve ease with grocery shopping  Long Term Goals:  To be accomplished in 12 weeks:  1) The patient will complete a full shift at work without an increase in pain  2) The patient will report ability to walk 30 minutes without an increase in pain to improve ability to care for her dogs  3) The patient will demonstrate ability to perform 20 SL HR without an increase in pain to improve ease with return to running and jumping    PLAN  [x]  Upgrade activities as tolerated     [x]  Continue plan of care  []  Update interventions per flow sheet       []  Discharge due to:_  []  Other:_      Zoey Prajapati, PT 3/13/2023

## 2023-03-20 ENCOUNTER — APPOINTMENT (OUTPATIENT)
Dept: PHYSICAL THERAPY | Age: 43
End: 2023-03-20
Payer: COMMERCIAL

## 2023-05-20 RX ORDER — BUPROPION HYDROCHLORIDE 150 MG/1
200 TABLET, EXTENDED RELEASE ORAL 2 TIMES DAILY
COMMUNITY

## 2023-05-20 RX ORDER — CEFDINIR 300 MG/1
300 CAPSULE ORAL 2 TIMES DAILY
COMMUNITY

## 2023-05-20 RX ORDER — ALBUTEROL SULFATE 90 UG/1
2 AEROSOL, METERED RESPIRATORY (INHALATION)
COMMUNITY

## 2024-06-19 ENCOUNTER — OFFICE VISIT (OUTPATIENT)
Age: 44
End: 2024-06-19

## 2024-06-19 VITALS
HEART RATE: 72 BPM | HEIGHT: 62 IN | OXYGEN SATURATION: 98 % | RESPIRATION RATE: 16 BRPM | DIASTOLIC BLOOD PRESSURE: 69 MMHG | WEIGHT: 118 LBS | TEMPERATURE: 98.3 F | SYSTOLIC BLOOD PRESSURE: 103 MMHG | BODY MASS INDEX: 21.71 KG/M2

## 2024-06-19 DIAGNOSIS — H92.02 EAR PAIN, LEFT: Primary | ICD-10-CM

## 2024-06-19 RX ORDER — DIAZEPAM 5 MG/1
TABLET ORAL
COMMUNITY
Start: 2023-01-31

## 2024-06-19 RX ORDER — TRAMADOL HYDROCHLORIDE 50 MG/1
50 TABLET ORAL EVERY 6 HOURS PRN
COMMUNITY
Start: 2023-01-31

## 2024-06-19 RX ORDER — FLUOXETINE HYDROCHLORIDE 20 MG/1
20 CAPSULE ORAL DAILY
COMMUNITY
Start: 2022-08-01

## 2024-06-19 RX ORDER — INDOMETHACIN 25 MG/1
25 CAPSULE ORAL
COMMUNITY
Start: 2022-11-30

## 2024-06-19 RX ORDER — AMPHETAMINE 15.7 MG/1
TABLET, ORALLY DISINTEGRATING ORAL
COMMUNITY
Start: 2024-05-23

## 2024-06-19 RX ORDER — DEXTROAMPHETAMINE SACCHARATE, AMPHETAMINE ASPARTATE, DEXTROAMPHETAMINE SULFATE AND AMPHETAMINE SULFATE 7.5; 7.5; 7.5; 7.5 MG/1; MG/1; MG/1; MG/1
30 TABLET ORAL DAILY
COMMUNITY
Start: 2022-08-31

## 2024-06-19 RX ORDER — DROSPIRENONE AND ESTETROL 3-14.2(28)
KIT ORAL
COMMUNITY
Start: 2024-05-23

## 2024-06-19 NOTE — PROGRESS NOTES
Amanda Dobbs (:  1980) is a 44 y.o. female,New patient, here for evaluation of the following chief complaint(s):  Otalgia (Left ear infection, has a damaged ear dum, hx of ear infections )      Assessment & Plan :  Visit Diagnoses and Associated Orders       Ear pain, left    -  Primary         ORDERS WITHOUT AN ASSOCIATED DIAGNOSIS    traMADol (ULTRAM) 50 MG tablet [20455]      indomethacin (INDOCIN) 25 MG capsule [3897]      FLUoxetine (PROZAC) 20 MG capsule [45806]      NEXTSTELLIS 3-14.2 MG TABS [708062]      diazePAM (VALIUM) 5 MG tablet [0905]      amphetamine-dextroamphetamine (ADDERALL) 30 MG tablet [50288]      ADZENYS XR-ODT 15.7 MG TBED [759192]               Follow up in 2 days if symptoms persist or if symptoms worsen.       Subjective :    HPI:   44 y.o. female presents with symptoms of Ear Pain  Patient complains of left ear pain and possible ear infection. Symptoms include left ear drainage  and left ear pain. Onset of symptoms was 3 days ago after swimming, unchanged since that time. Has a history or recurrent ear infections with a damaged ear drum, but has not be able to get in with her ENT. Left TM unable to be visualized due to impacted cerumen, patient declined ear lavage as this is usually done with a vacuum at her ENT. Patient educated that antibiotics are unable to be prescribed at this time due to inability to visualize TM and inability to fully visualize ear canal. Patient left facility prior to receiving discharge instructions.         Vitals:    24 0816   BP: 103/69   Site: Left Upper Arm   Position: Sitting   Cuff Size: Medium Adult   Pulse: 72   Resp: 16   Temp: 98.3 °F (36.8 °C)   TempSrc: Oral   SpO2: 98%   Weight: 53.5 kg (118 lb)   Height: 1.562 m (5' 1.5\")          Objective   Physical Exam  Constitutional:       Appearance: Normal appearance.   HENT:      Head: Normocephalic and atraumatic.      Right Ear: Tympanic membrane, ear canal and external ear normal.